# Patient Record
Sex: MALE | Race: WHITE | Employment: OTHER | URBAN - METROPOLITAN AREA
[De-identification: names, ages, dates, MRNs, and addresses within clinical notes are randomized per-mention and may not be internally consistent; named-entity substitution may affect disease eponyms.]

---

## 2017-02-17 PROBLEM — I10 ESSENTIAL HYPERTENSION WITH GOAL BLOOD PRESSURE LESS THAN 130/85: Status: ACTIVE | Noted: 2017-02-17

## 2017-02-17 PROBLEM — E78.2 MIXED HYPERLIPIDEMIA: Status: ACTIVE | Noted: 2017-02-17

## 2018-02-06 PROBLEM — I77.9 CAROTID DISEASE, BILATERAL (HCC): Status: ACTIVE | Noted: 2018-02-06

## 2018-08-07 PROBLEM — R00.1 BRADYCARDIA: Status: ACTIVE | Noted: 2018-08-07

## 2018-10-09 PROBLEM — I77.9 BILATERAL CAROTID ARTERY DISEASE (HCC): Status: ACTIVE | Noted: 2018-10-09

## 2018-10-09 PROBLEM — M79.605 LEG PAIN, LEFT: Status: ACTIVE | Noted: 2018-10-09

## 2018-11-15 PROBLEM — I73.9 CLAUDICATION OF GLUTEAL REGION (HCC): Status: ACTIVE | Noted: 2018-11-15

## 2018-11-15 PROBLEM — M25.552 LEFT HIP PAIN: Status: ACTIVE | Noted: 2018-11-15

## 2018-11-19 ENCOUNTER — HOSPITAL ENCOUNTER (OUTPATIENT)
Dept: SURGERY | Age: 83
Discharge: HOME | End: 2018-11-19
Payer: MEDICARE

## 2018-11-19 VITALS
BODY MASS INDEX: 28 KG/M2 | RESPIRATION RATE: 16 BRPM | HEART RATE: 60 BPM | SYSTOLIC BLOOD PRESSURE: 157 MMHG | DIASTOLIC BLOOD PRESSURE: 82 MMHG | TEMPERATURE: 97.9 F | OXYGEN SATURATION: 98 % | WEIGHT: 200 LBS | HEIGHT: 71 IN

## 2018-11-19 LAB
CREAT SERPL-MCNC: 1.1 MG/DL (ref 0.8–1.5)
HGB BLD-MCNC: 14.5 G/DL (ref 13.6–17.2)
POTASSIUM SERPL-SCNC: 4.8 MMOL/L (ref 3.5–5.1)

## 2018-11-19 PROCEDURE — 84132 ASSAY OF SERUM POTASSIUM: CPT

## 2018-11-19 PROCEDURE — 82565 ASSAY OF CREATININE: CPT

## 2018-11-19 PROCEDURE — 85018 HEMOGLOBIN: CPT

## 2018-11-19 NOTE — PERIOP NOTES
Patient verified name and . Patient provided medical/health information and PTA medications to the best of their ability. TYPE  CASE:1b  Order for consent yes found in EHR and matches case posting. Labs per surgeon:greatinine/gfr. Results: -  Labs per anesthesia protocol: hgb,potassium. Results -  EKG  :  yes    Patient provided with and instructed on education handouts including Guide to Surgery, blood transfusions, pain management, and hand hygiene for the family and community, and [facility] brochure. Munira mist and instructions given per hospital policy. Instructed patient to continue previous medications as prescribed prior to surgery unless otherwise directed and to take the following medications the day of surgery according to anesthesia guidelines : amlodipine,aspirin,plavix . Instructed patient to hold  the following medications: meloxicam.    Original medication prescription bottles none visualized during patient appointment. Patient teach back successful and patient demonstrates knowledge of instruction.

## 2018-11-20 ENCOUNTER — ANESTHESIA EVENT (OUTPATIENT)
Dept: SURGERY | Age: 83
End: 2018-11-20
Payer: MEDICARE

## 2018-11-21 ENCOUNTER — HOSPITAL ENCOUNTER (OUTPATIENT)
Age: 83
Discharge: HOME | End: 2018-11-21
Attending: SURGERY | Admitting: SURGERY
Payer: MEDICARE

## 2018-11-21 ENCOUNTER — ANESTHESIA (OUTPATIENT)
Dept: SURGERY | Age: 83
End: 2018-11-21
Payer: MEDICARE

## 2018-11-21 ENCOUNTER — APPOINTMENT (OUTPATIENT)
Dept: INTERVENTIONAL RADIOLOGY/VASCULAR | Age: 83
End: 2018-11-21
Attending: SURGERY
Payer: MEDICARE

## 2018-11-21 VITALS
SYSTOLIC BLOOD PRESSURE: 141 MMHG | DIASTOLIC BLOOD PRESSURE: 90 MMHG | BODY MASS INDEX: 27.48 KG/M2 | TEMPERATURE: 97.8 F | HEART RATE: 64 BPM | HEIGHT: 71 IN | RESPIRATION RATE: 16 BRPM | OXYGEN SATURATION: 96 % | WEIGHT: 196.25 LBS

## 2018-11-21 PROBLEM — I77.1 ILIAC ARTERY STENOSIS, LEFT (HCC): Status: ACTIVE | Noted: 2018-11-21

## 2018-11-21 PROBLEM — I73.9 CLAUDICATION OF LEFT LOWER EXTREMITY (HCC): Status: ACTIVE | Noted: 2018-11-21

## 2018-11-21 PROBLEM — M79.605 LEFT LEG PAIN: Status: ACTIVE | Noted: 2018-11-21

## 2018-11-21 PROCEDURE — C1887 CATHETER, GUIDING: HCPCS

## 2018-11-21 PROCEDURE — 76060000034 HC ANESTHESIA 1.5 TO 2 HR: Performed by: SURGERY

## 2018-11-21 PROCEDURE — 74011250636 HC RX REV CODE- 250/636: Performed by: SURGERY

## 2018-11-21 PROCEDURE — 74011250636 HC RX REV CODE- 250/636

## 2018-11-21 PROCEDURE — C1769 GUIDE WIRE: HCPCS

## 2018-11-21 PROCEDURE — 74011000250 HC RX REV CODE- 250: Performed by: NURSE PRACTITIONER

## 2018-11-21 PROCEDURE — 77030020782 HC GWN BAIR PAWS FLX 3M -B: Performed by: ANESTHESIOLOGY

## 2018-11-21 PROCEDURE — C1874 STENT, COATED/COV W/DEL SYS: HCPCS | Performed by: SURGERY

## 2018-11-21 PROCEDURE — 74011250636 HC RX REV CODE- 250/636: Performed by: ANESTHESIOLOGY

## 2018-11-21 PROCEDURE — 74011250637 HC RX REV CODE- 250/637: Performed by: ANESTHESIOLOGY

## 2018-11-21 PROCEDURE — 37221 IR AORTOGRAPHY ABDOMINAL SERIAL: CPT

## 2018-11-21 PROCEDURE — 74011000258 HC RX REV CODE- 258: Performed by: SURGERY

## 2018-11-21 PROCEDURE — 76210000030 HC REC RM PH II 5.5 TO 6 HR: Performed by: SURGERY

## 2018-11-21 PROCEDURE — C1894 INTRO/SHEATH, NON-LASER: HCPCS

## 2018-11-21 PROCEDURE — 77030021532 HC CATH ANGI DX IMPRS MRTM -B

## 2018-11-21 PROCEDURE — 74011000258 HC RX REV CODE- 258: Performed by: NURSE PRACTITIONER

## 2018-11-21 PROCEDURE — C1725 CATH, TRANSLUMIN NON-LASER: HCPCS

## 2018-11-21 PROCEDURE — 74011636320 HC RX REV CODE- 636/320: Performed by: SURGERY

## 2018-11-21 PROCEDURE — 77030013519 HC DEV INFL BASIX MRTM -B

## 2018-11-21 PROCEDURE — C1760 CLOSURE DEV, VASC: HCPCS

## 2018-11-21 PROCEDURE — 76010000153 HC OR TIME 1.5 TO 2 HR: Performed by: SURGERY

## 2018-11-21 PROCEDURE — 76210000006 HC OR PH I REC 0.5 TO 1 HR: Performed by: SURGERY

## 2018-11-21 DEVICE — VIABAHN BX BALLOON EXP ENDO 9MMX39MM 8FR 135CMCATH HEPARIN
Type: IMPLANTABLE DEVICE | Site: GROIN | Status: FUNCTIONAL
Brand: GORE VIABAHN VBX BALLOON EXPANDABLE ENDO

## 2018-11-21 RX ORDER — SODIUM CHLORIDE 0.9 % (FLUSH) 0.9 %
5-10 SYRINGE (ML) INJECTION AS NEEDED
Status: DISCONTINUED | OUTPATIENT
Start: 2018-11-21 | End: 2018-11-21 | Stop reason: HOSPADM

## 2018-11-21 RX ORDER — ONDANSETRON 2 MG/ML
4 INJECTION INTRAMUSCULAR; INTRAVENOUS
Status: DISCONTINUED | OUTPATIENT
Start: 2018-11-21 | End: 2018-11-21 | Stop reason: HOSPADM

## 2018-11-21 RX ORDER — FENTANYL CITRATE 50 UG/ML
100 INJECTION, SOLUTION INTRAMUSCULAR; INTRAVENOUS ONCE
Status: DISCONTINUED | OUTPATIENT
Start: 2018-11-21 | End: 2018-11-21 | Stop reason: HOSPADM

## 2018-11-21 RX ORDER — LIDOCAINE HYDROCHLORIDE 20 MG/ML
INJECTION, SOLUTION EPIDURAL; INFILTRATION; INTRACAUDAL; PERINEURAL AS NEEDED
Status: DISCONTINUED | OUTPATIENT
Start: 2018-11-21 | End: 2018-11-21 | Stop reason: HOSPADM

## 2018-11-21 RX ORDER — DIPHENHYDRAMINE HYDROCHLORIDE 50 MG/ML
12.5 INJECTION, SOLUTION INTRAMUSCULAR; INTRAVENOUS
Status: DISCONTINUED | OUTPATIENT
Start: 2018-11-21 | End: 2018-11-21 | Stop reason: HOSPADM

## 2018-11-21 RX ORDER — FAMOTIDINE 20 MG/1
20 TABLET, FILM COATED ORAL ONCE
Status: COMPLETED | OUTPATIENT
Start: 2018-11-21 | End: 2018-11-21

## 2018-11-21 RX ORDER — IODIXANOL 320 MG/ML
INJECTION, SOLUTION INTRAVASCULAR AS NEEDED
Status: DISCONTINUED | OUTPATIENT
Start: 2018-11-21 | End: 2018-11-21 | Stop reason: HOSPADM

## 2018-11-21 RX ORDER — LIDOCAINE HYDROCHLORIDE 10 MG/ML
0.1 INJECTION INFILTRATION; PERINEURAL AS NEEDED
Status: DISCONTINUED | OUTPATIENT
Start: 2018-11-21 | End: 2018-11-21 | Stop reason: HOSPADM

## 2018-11-21 RX ORDER — PROPOFOL 10 MG/ML
INJECTION, EMULSION INTRAVENOUS
Status: DISCONTINUED | OUTPATIENT
Start: 2018-11-21 | End: 2018-11-21 | Stop reason: HOSPADM

## 2018-11-21 RX ORDER — MIDAZOLAM HYDROCHLORIDE 1 MG/ML
2 INJECTION, SOLUTION INTRAMUSCULAR; INTRAVENOUS
Status: DISCONTINUED | OUTPATIENT
Start: 2018-11-21 | End: 2018-11-21 | Stop reason: HOSPADM

## 2018-11-21 RX ORDER — NALOXONE HYDROCHLORIDE 0.4 MG/ML
0.2 INJECTION, SOLUTION INTRAMUSCULAR; INTRAVENOUS; SUBCUTANEOUS AS NEEDED
Status: DISCONTINUED | OUTPATIENT
Start: 2018-11-21 | End: 2018-11-21 | Stop reason: HOSPADM

## 2018-11-21 RX ORDER — SODIUM CHLORIDE 9 MG/ML
75 INJECTION, SOLUTION INTRAVENOUS CONTINUOUS
Status: ACTIVE | OUTPATIENT
Start: 2018-11-21 | End: 2018-11-21

## 2018-11-21 RX ORDER — OXYCODONE HYDROCHLORIDE 5 MG/1
5 TABLET ORAL
Status: DISCONTINUED | OUTPATIENT
Start: 2018-11-21 | End: 2018-11-21 | Stop reason: HOSPADM

## 2018-11-21 RX ORDER — SODIUM CHLORIDE, SODIUM LACTATE, POTASSIUM CHLORIDE, CALCIUM CHLORIDE 600; 310; 30; 20 MG/100ML; MG/100ML; MG/100ML; MG/100ML
25 INJECTION, SOLUTION INTRAVENOUS CONTINUOUS
Status: DISCONTINUED | OUTPATIENT
Start: 2018-11-21 | End: 2018-11-21 | Stop reason: HOSPADM

## 2018-11-21 RX ORDER — PROPOFOL 10 MG/ML
INJECTION, EMULSION INTRAVENOUS AS NEEDED
Status: DISCONTINUED | OUTPATIENT
Start: 2018-11-21 | End: 2018-11-21 | Stop reason: HOSPADM

## 2018-11-21 RX ORDER — NALOXONE HYDROCHLORIDE 0.4 MG/ML
0.2 INJECTION, SOLUTION INTRAMUSCULAR; INTRAVENOUS; SUBCUTANEOUS
Status: DISCONTINUED | OUTPATIENT
Start: 2018-11-21 | End: 2018-11-21 | Stop reason: HOSPADM

## 2018-11-21 RX ORDER — SODIUM CHLORIDE 0.9 % (FLUSH) 0.9 %
5-10 SYRINGE (ML) INJECTION EVERY 8 HOURS
Status: DISCONTINUED | OUTPATIENT
Start: 2018-11-21 | End: 2018-11-21 | Stop reason: HOSPADM

## 2018-11-21 RX ORDER — PROTAMINE SULFATE 10 MG/ML
INJECTION, SOLUTION INTRAVENOUS AS NEEDED
Status: DISCONTINUED | OUTPATIENT
Start: 2018-11-21 | End: 2018-11-21 | Stop reason: HOSPADM

## 2018-11-21 RX ORDER — HEPARIN SODIUM 1000 [USP'U]/ML
INJECTION, SOLUTION INTRAVENOUS; SUBCUTANEOUS AS NEEDED
Status: DISCONTINUED | OUTPATIENT
Start: 2018-11-21 | End: 2018-11-21 | Stop reason: HOSPADM

## 2018-11-21 RX ORDER — MORPHINE SULFATE 10 MG/ML
1 INJECTION, SOLUTION INTRAMUSCULAR; INTRAVENOUS
Status: DISCONTINUED | OUTPATIENT
Start: 2018-11-21 | End: 2018-11-21 | Stop reason: HOSPADM

## 2018-11-21 RX ORDER — MIDAZOLAM HYDROCHLORIDE 1 MG/ML
2 INJECTION, SOLUTION INTRAMUSCULAR; INTRAVENOUS ONCE
Status: DISCONTINUED | OUTPATIENT
Start: 2018-11-21 | End: 2018-11-21 | Stop reason: HOSPADM

## 2018-11-21 RX ORDER — HYDROMORPHONE HYDROCHLORIDE 2 MG/ML
0.5 INJECTION, SOLUTION INTRAMUSCULAR; INTRAVENOUS; SUBCUTANEOUS
Status: DISCONTINUED | OUTPATIENT
Start: 2018-11-21 | End: 2018-11-21 | Stop reason: HOSPADM

## 2018-11-21 RX ORDER — OXYCODONE AND ACETAMINOPHEN 5; 325 MG/1; MG/1
1 TABLET ORAL
Status: DISCONTINUED | OUTPATIENT
Start: 2018-11-21 | End: 2018-11-21 | Stop reason: HOSPADM

## 2018-11-21 RX ORDER — SODIUM CHLORIDE, SODIUM LACTATE, POTASSIUM CHLORIDE, CALCIUM CHLORIDE 600; 310; 30; 20 MG/100ML; MG/100ML; MG/100ML; MG/100ML
75 INJECTION, SOLUTION INTRAVENOUS CONTINUOUS
Status: DISCONTINUED | OUTPATIENT
Start: 2018-11-21 | End: 2018-11-21 | Stop reason: HOSPADM

## 2018-11-21 RX ADMIN — SODIUM CHLORIDE 900 MG: 9 INJECTION, SOLUTION INTRAVENOUS at 08:43

## 2018-11-21 RX ADMIN — SODIUM CHLORIDE, SODIUM LACTATE, POTASSIUM CHLORIDE, AND CALCIUM CHLORIDE: 600; 310; 30; 20 INJECTION, SOLUTION INTRAVENOUS at 09:42

## 2018-11-21 RX ADMIN — HEPARIN SODIUM 4000 UNITS: 1000 INJECTION, SOLUTION INTRAVENOUS; SUBCUTANEOUS at 08:57

## 2018-11-21 RX ADMIN — FAMOTIDINE 20 MG: 20 TABLET ORAL at 07:41

## 2018-11-21 RX ADMIN — LIDOCAINE HYDROCHLORIDE 100 MG: 20 INJECTION, SOLUTION EPIDURAL; INFILTRATION; INTRACAUDAL; PERINEURAL at 08:42

## 2018-11-21 RX ADMIN — SODIUM CHLORIDE, SODIUM LACTATE, POTASSIUM CHLORIDE, AND CALCIUM CHLORIDE 25 ML/HR: 600; 310; 30; 20 INJECTION, SOLUTION INTRAVENOUS at 07:04

## 2018-11-21 RX ADMIN — PROPOFOL 50 MG: 10 INJECTION, EMULSION INTRAVENOUS at 08:42

## 2018-11-21 RX ADMIN — PROPOFOL 140 MCG/KG/MIN: 10 INJECTION, EMULSION INTRAVENOUS at 08:42

## 2018-11-21 RX ADMIN — GENTAMICIN SULFATE 408 MG: 40 INJECTION, SOLUTION INTRAMUSCULAR; INTRAVENOUS at 07:04

## 2018-11-21 RX ADMIN — PROTAMINE SULFATE 20 MG: 10 INJECTION, SOLUTION INTRAVENOUS at 09:41

## 2018-11-21 NOTE — BRIEF OP NOTE
BRIEF OPERATIVE NOTE Date of Procedure: 11/21/2018 Preoperative Diagnosis: Intermittent claudication (HCC) [I73.9] Postoperative Diagnosis: Intermittent claudication (HCC) [I73.9] Procedure(s): ULTRASOUND GUIDED ACCESS AOPRTAGRAM BILATERAL LOWER LEGS RIGHT  ILIAC PTA LEFT ILIAC STENT AND PTA LEG ARTERIOGRAM WITH POSS INTERVENTION Surgeon(s) and Role: Griselda Bence, Barrett Chalk, MD - Primary Surgical Assistant:  
 
Surgical Staff: 
Circ-1: Helio Moon RN 
Circ-Relief: James Raman RN Radiology Technician: Ran Woods, RT, R, CT; Jeramie Thomson; Colette Mays, RT, R, CT Event Time In Time Out Incision Start 4803 Incision Close Anesthesia: General  
Estimated Blood Loss:  
Specimens: * No specimens in log * Findings:   
Complications: None Implants:  
Implant Name Type Inv. Item Serial No.  Lot No. LRB No. Used Action GRAFT STNT EXP [id number] [place] Stent GRAFT STNT EXP [id number] -- Richard Eaton [de-identified]  [employer]  Left 1 Implanted

## 2018-11-21 NOTE — BRIEF OP NOTE
BRIEF OPERATIVE NOTE Date of Procedure: 11/21/2018 Preoperative Diagnosis: Intermittent claudication (HCC) [I73.9] Postoperative Diagnosis: Intermittent claudication (HCC) [I73.9] Procedure(s): ULTRASOUND GUIDED ACCESS AOPRTAGRAM BILATERAL LOWER LEGS RIGHT  ILIAC PTA LEFT ILIAC STENT AND PTA LEG ARTERIOGRAM WITH POSS INTERVENTION Surgeon(s) and Role: Raquel Bear MD - Primary Surgical Assistant:  
 
Surgical Staff: 
Circ-1: Álvaro Mantilla RN 
Circ-Relief: Sean Qureshi RN Radiology Technician: Joel Huang, RT, R, CT; Lesley Wylie; Abby Shell, RT, R, CT Event Time In Time Out Incision Start 7308 Incision Close Anesthesia: General  
Estimated Blood Loss:  
Specimens: * No specimens in log * Findings:   
Complications: None Implants:  
Implant Name Type Inv. Item Serial No.  Lot No. LRB No. Used Action GRAFT STNT EXP [id number] [place] Goes Stent GRAFT STNT EXP [id number] -- Ariadna Yachats [de-identified]  [employer]  Left 1 Implanted

## 2018-11-21 NOTE — DISCHARGE INSTRUCTIONS
Arteriogram: What to Expect at [address] [place] [address] Drive inserted a thin, flexible tube (catheter) into a blood vessel in your groin. In some cases, the catheter is placed in a blood vessel in the arm. After an arteriogram, your groin or arm may have a bruise and feel sore for a day or two. You can do light activities around the house but nothing strenuous for several days. Your doctor may give you specific instructions on when you can do your normal activities again, such as driving and going back to work. This care sheet gives you a general idea about how long it will take for you to recover. But each person recovers at a different pace. Follow the steps below to feel better as quickly as possible. How can you care for yourself at home? Activity  · Do not do strenuous exercise and do not lift, pull, or push anything heavy until your doctor says it is okay. This may be for a day or two. You can walk around the house and do light activity, such as cooking. · You may shower 24 to 48 hours after the procedure, if your doctor okays it. Pat the incision dry. Do not take a bath for 1 week, or until your doctor tells you it is okay. · If the catheter was placed in your groin, try not to walk up stairs for the first couple of days. · If your doctor recommends it, get more exercise. Walking is a good choice. Bit by bit, increase the amount you walk every day. Try for at least 30 minutes on most days of the week. Diet  · Drink plenty of fluids to help your body flush out the dye. If you have kidney, heart, or liver disease and have to limit fluids, talk with your doctor before you increase the amount of fluids you drink. · You can eat your normal diet. If your stomach is upset, try bland, low-fat foods like plain rice, broiled chicken, toast, and yogurt. Medicines  · Be safe with medicines. Read and follow all instructions on the label.   ¨ If the doctor gave you a prescription medicine for pain, take it as prescribed. ¨ If you are not taking a prescription pain medicine, ask your doctor if you can take an over-the-counter medicine. · If you take blood thinners, such as warfarin (Coumadin), clopidogrel (Plavix), or aspirin, be sure to talk to your doctor. He or she will tell you if and when to start taking those medicines again. Make sure that you understand exactly what your doctor wants you to do. · Your doctor will tell you if and when you can restart your medicines. He or she will also give you instructions about taking any new medicines. Care of the catheter site  · You will have a dressing over the cut (incision). A dressing helps the incision heal and protects it. Your doctor will tell you how to take care of this. · Put ice or a cold pack on the area for 10 to 20 minutes at a time to help with soreness or swelling. Put a thin cloth between the ice and your skin. Follow-up care is a key part of your treatment and safety. Be sure to make and go to all appointments, and call your doctor if you are having problems. It's also a good idea to know your test results and keep a list of the medicines you take. When should you call for help? Call 911 anytime you think you may need emergency care. For example, call if:  · You passed out (lost consciousness). · You have severe trouble breathing. · You have sudden chest pain and shortness of breath, or you cough up blood. Call your doctor now or seek immediate medical care if:  · You are bleeding from the area where the catheter was put in your artery. · You have a fast-growing, painful lump at the catheter site. · You have signs of infection, such as:  ¨ Increased pain, swelling, warmth, or redness. ¨ Red streaks leading from the incision. ¨ Pus draining from the incision. ¨ A fever. Watch closely for any changes in your health, and be sure to contact your doctor if:  · You don't get better as expected.   After general anesthesia or intravenous sedation, for 24 hours or while taking prescription Narcotics:  · Limit your activities  · Do not drive and operate hazardous machinery  · Do not make important personal or business decisions  · Do  not drink alcoholic beverages  · If you have not urinated within 8 hours after discharge, please contact your surgeon on call. *  Please give a list of your current medications to your Primary Care Provider. *  Please update this list whenever your medications are discontinued, doses are      changed, or new medications (including over-the-counter products) are added. *  Please carry medication information at all times in case of emergency situations. These are general instructions for a healthy lifestyle:  No smoking/ No tobacco products/ Avoid exposure to second hand smoke  Surgeon General's Warning:  Quitting smoking now greatly reduces serious risk to your health. Obesity, smoking, and sedentary lifestyle greatly increases your risk for illness  A healthy diet, regular physical exercise & weight monitoring are important for maintaining a healthy lifestyle    You may be retaining fluid if you have a history of heart failure or if you experience any of the following symptoms:  Weight gain of 3 pounds or more overnight or 5 pounds in a week, increased swelling in our hands or feet or shortness of breath while lying flat in bed. Please call your doctor as soon as you notice any of these symptoms; do not wait until your next office visit. Recognize signs and symptoms of STROKE:    F-face looks uneven    A-arms unable to move or move unevenly    S-speech slurred or non-existent    T-time-call 911 as soon as signs and symptoms begin-DO NOT go       Back to bed or wait to see if you get better-TIME IS BRAIN.

## 2018-11-21 NOTE — ANESTHESIA PREPROCEDURE EVALUATION
Anesthetic History PONV Review of Systems / Medical History Patient summary reviewed and pertinent labs reviewed Pulmonary Asthma : well controlled Neuro/Psych Within defined limits Cardiovascular Hypertension Dysrhythmias (Paroxysmal) : atrial fibrillation CAD, PAD and cardiac stents (Last STANISLAW 2014) Exercise tolerance: >4 METS Comments: Denies CP, SOB or changes in functional status GI/Hepatic/Renal 
Within defined limits Endo/Other Within defined limits Other Findings Physical Exam 
 
Airway Mallampati: II 
TM Distance: 4 - 6 cm Neck ROM: normal range of motion Mouth opening: Normal 
 
 Cardiovascular Rhythm: regular Rate: normal 
 
 
 
 Dental 
 
Dentition: Caps/crowns Pulmonary Breath sounds clear to auscultation Abdominal 
GI exam deferred Other Findings Anesthetic Plan ASA: 3 Anesthesia type: total IV anesthesia Induction: Intravenous Anesthetic plan and risks discussed with: Patient and Spouse

## 2018-11-21 NOTE — OP NOTES
[facility] REPORT    Eh Mims  MR#: [id number]  : 1935  ACCOUNT #: [de-identified]   DATE OF SERVICE: 2018    PREOPERATIVE DIAGNOSIS:  Left lower extremity ischemia. POSTOPERATIVE DIAGNOSIS:  Left lower extremity ischemia. PROCEDURES:  1. Abdominal aortogram.  2.  Bilateral lower extremity arterial imaging. 3.  Left common iliac artery PTA and stent placement (9 x 39 [place] VBX). 4.  Ultrasound-guided bilateral common femoral artery access. SURGEON:  Jacquelyn Kitchen MD    ANESTHESIA:  IV sedation with 1% lidocaine local.    TOTAL CONTRAST:  160 mL of Visipaque. TOTAL FLUOROSCOPY TIME:  9 minutes. DESCRIPTION OF PROCEDURE:  The patient was brought to the angio suite, placed on the angio table in supine position. Following adequate IV sedation and a timeout procedure,  the right common femoral artery was then percutaneously punctured under direct vision using ultrasound and local anesthesia. A 5-Burmese sheath was placed over a guidewire. Next, an 0.035 guidewire was advanced in the aorta followed by a 5-Burmese Omniflush catheter. An abdominal aortogram was performed. The catheter was then pulled down just above the aortic bifurcation where bilateral pelvic oblique imaging was performed. Next, bilateral lower extremity bolus bimal imaging was performed  imaging both lower extremities. The patient was systemically heparinized. The left common femoral artery was then percutaneously punctured and an 8 Burmese sheath was placed over a guidewire. An 0.035 guidewire and catheter combination were used to retrograde cannulate the severe proximal left common iliac artery stenosis. At this point, a 9 x 39 VBX stent was advanced over the guidewire and an 8 mm balloon was placed in the common iliac on the right to protect this artery during balloon angioplasty of the balloon expandable stent on the left.   The balloon on the right was expanded followed by deployment of the VBX stent on the left. Completion imaging demonstrated the excellent flow with no residual stenosis. The right iliac system remained widely patent. At this point, protamine was administered and the sheaths were removed. A Mynx closure device was successfully used on the right; however, direct pressure was held on left due to the large caliber sheath that was placed initially. Patient was then awakened from anesthesia and transferred to the recovery room in stable condition. The patient tolerated the procedure well. No complications. ANGIOGRAPHIC FINDINGS:  The abdominal aorta was normal caliber. There is dense calcification noted, but no focal stenosis or aneurysmal change. There are a single right renal artery appeared to be a single left renal artery that branches early. The common iliac arteries on the right is widely patent, on the left there is a very focal eccentric large bulky plaque with high-grade stenosis of the proximal common iliac. Beyond there is poststenotic dilatation. Hypogastric arteries are patent on both sides. The external iliac arteries are patent on both sides. On the right, common femoral and profunda are widely patent. The SFA is patent. There is some diffuse calcification noted at Red's canal, but no focal stenosis. The popliteal artery is widely patent. There is a patent trifurcation and what appears to be 3-vessel runoff to the foot on the right. On the left, common femoral, profunda and SFA are patent. There is some moderate irregularity at Red's canal.  The popliteal artery was patent with no focal stenosis. Trifurcation is patent and there is 3-vessel runoff to the foot with the posterior tibial to be the dominant vessel. IMPRESSION:  Satisfactory PTA and stent placement of high-grade left common iliac artery stenosis.       MD Benson Wilkins / [facility]  D: 11/21/2018 10:36     T: 11/21/2018 12:05  JOB #: [id number]

## 2018-11-21 NOTE — PROGRESS NOTES
's pre-op visit and prayer with patient as requested. Chiqui Cleaning MDiv, BS Board Certified [employer]

## 2018-11-21 NOTE — PERIOP NOTES
Adilson Hawkins called to verify patient's bedrest post operatively, states patient to be 4 hours supine, then 2 hours at 30 degrees head elevation prior to discharge home. Will monitor patient in PACU until then, patient doing well, wife at bedside at this time.

## 2018-11-21 NOTE — ANESTHESIA POSTPROCEDURE EVALUATION
Procedure(s): ULTRASOUND GUIDED ACCESS AOPRTAGRAM BILATERAL LOWER LEGS RIGHT  ILIAC PTA LEFT ILIAC STENT AND PTA LEG ARTERIOGRAM WITH POSS INTERVENTION. Anesthesia Post Evaluation Multimodal analgesia: multimodal analgesia used between 6 hours prior to anesthesia start to PACU discharge Patient location during evaluation: bedside Patient participation: complete - patient participated Level of consciousness: awake and alert Pain score: 1 Pain management: adequate Airway patency: patent Anesthetic complications: no 
Cardiovascular status: acceptable Respiratory status: acceptable Hydration status: acceptable Comments: Pt doing well. Ok to d/c home. Visit Vitals /77 Pulse (!) 52 Temp 36.6 °C (97.8 °F) Resp 16 Ht 5' 11\" (1.803 m) Wt 89 kg (196 lb 4 oz) SpO2 98% BMI 27.37 kg/m²

## 2018-11-24 ENCOUNTER — HOSPITAL ENCOUNTER (OUTPATIENT)
Age: 83
Setting detail: OBSERVATION
Discharge: HOME | End: 2018-11-26
Attending: EMERGENCY MEDICINE | Admitting: INTERNAL MEDICINE
Payer: MEDICARE

## 2018-11-24 ENCOUNTER — APPOINTMENT (OUTPATIENT)
Dept: CT IMAGING | Age: 83
End: 2018-11-24
Attending: EMERGENCY MEDICINE
Payer: MEDICARE

## 2018-11-24 ENCOUNTER — APPOINTMENT (OUTPATIENT)
Dept: GENERAL RADIOLOGY | Age: 83
End: 2018-11-24
Attending: EMERGENCY MEDICINE
Payer: MEDICARE

## 2018-11-24 DIAGNOSIS — R55 NEAR SYNCOPE: Primary | ICD-10-CM

## 2018-11-24 PROBLEM — M19.90 ARTHRITIS: Chronic | Status: ACTIVE | Noted: 2018-11-24

## 2018-11-24 LAB
ALBUMIN SERPL-MCNC: 3.6 G/DL (ref 3.2–4.6)
ALBUMIN/GLOB SERPL: 1 {RATIO} (ref 1.2–3.5)
ALP SERPL-CCNC: 62 U/L (ref 50–136)
ALT SERPL-CCNC: 24 U/L (ref 12–65)
ANION GAP SERPL CALC-SCNC: 10 MMOL/L (ref 7–16)
APTT PPP: 24.5 SEC (ref 23.2–35.3)
AST SERPL-CCNC: 30 U/L (ref 15–37)
ATRIAL RATE: 59 BPM
BASOPHILS # BLD: 0 K/UL (ref 0–0.2)
BASOPHILS NFR BLD: 0 % (ref 0–2)
BILIRUB SERPL-MCNC: 0.7 MG/DL (ref 0.2–1.1)
BUN SERPL-MCNC: 23 MG/DL (ref 8–23)
CALCIUM SERPL-MCNC: 8.8 MG/DL (ref 8.3–10.4)
CALCULATED P AXIS, ECG09: 53 DEGREES
CALCULATED R AXIS, ECG10: 34 DEGREES
CALCULATED T AXIS, ECG11: 6 DEGREES
CHLORIDE SERPL-SCNC: 106 MMOL/L (ref 98–107)
CO2 SERPL-SCNC: 25 MMOL/L (ref 21–32)
CREAT SERPL-MCNC: 1.09 MG/DL (ref 0.8–1.5)
DIAGNOSIS, 93000: NORMAL
DIFFERENTIAL METHOD BLD: ABNORMAL
EOSINOPHIL # BLD: 0.2 K/UL (ref 0–0.8)
EOSINOPHIL NFR BLD: 2 % (ref 0.5–7.8)
ERYTHROCYTE [DISTWIDTH] IN BLOOD BY AUTOMATED COUNT: 13 %
GLOBULIN SER CALC-MCNC: 3.7 G/DL (ref 2.3–3.5)
GLUCOSE SERPL-MCNC: 79 MG/DL (ref 65–100)
HCT VFR BLD AUTO: 43.3 % (ref 41.1–50.3)
HGB BLD-MCNC: 14.1 G/DL (ref 13.6–17.2)
IMM GRANULOCYTES # BLD: 0 K/UL (ref 0–0.5)
IMM GRANULOCYTES NFR BLD AUTO: 0 % (ref 0–5)
INR PPP: 1.3
LYMPHOCYTES # BLD: 1.4 K/UL (ref 0.5–4.6)
LYMPHOCYTES NFR BLD: 14 % (ref 13–44)
MCH RBC QN AUTO: 31.5 PG (ref 26.1–32.9)
MCHC RBC AUTO-ENTMCNC: 32.6 G/DL (ref 31.4–35)
MCV RBC AUTO: 96.9 FL (ref 79.6–97.8)
MONOCYTES # BLD: 0.8 K/UL (ref 0.1–1.3)
MONOCYTES NFR BLD: 8 % (ref 4–12)
NEUTS SEG # BLD: 7.9 K/UL (ref 1.7–8.2)
NEUTS SEG NFR BLD: 76 % (ref 43–78)
NRBC # BLD: 0 K/UL (ref 0–0.2)
P-R INTERVAL, ECG05: 168 MS
PLATELET # BLD AUTO: 138 K/UL (ref 150–450)
PLATELET COMMENTS,PCOM: ADEQUATE
PMV BLD AUTO: 9.9 FL (ref 9.4–12.3)
POTASSIUM SERPL-SCNC: 4.2 MMOL/L (ref 3.5–5.1)
PROT SERPL-MCNC: 7.3 G/DL (ref 6.3–8.2)
PROTHROMBIN TIME: 15.6 SEC (ref 11.5–14.5)
Q-T INTERVAL, ECG07: 452 MS
QRS DURATION, ECG06: 106 MS
QTC CALCULATION (BEZET), ECG08: 447 MS
RBC # BLD AUTO: 4.47 M/UL (ref 4.23–5.6)
RBC MORPH BLD: ABNORMAL
SODIUM SERPL-SCNC: 141 MMOL/L (ref 136–145)
TROPONIN I SERPL-MCNC: 0.03 NG/ML (ref 0.02–0.05)
TROPONIN I SERPL-MCNC: 0.05 NG/ML (ref 0.02–0.05)
TROPONIN I SERPL-MCNC: 0.06 NG/ML (ref 0.02–0.05)
VENTRICULAR RATE, ECG03: 59 BPM
WBC # BLD AUTO: 10.3 K/UL (ref 4.3–11.1)

## 2018-11-24 PROCEDURE — 99285 EMERGENCY DEPT VISIT HI MDM: CPT | Performed by: EMERGENCY MEDICINE

## 2018-11-24 PROCEDURE — 74011636320 HC RX REV CODE- 636/320: Performed by: EMERGENCY MEDICINE

## 2018-11-24 PROCEDURE — 74011250637 HC RX REV CODE- 250/637: Performed by: NURSE PRACTITIONER

## 2018-11-24 PROCEDURE — 71260 CT THORAX DX C+: CPT

## 2018-11-24 PROCEDURE — 81003 URINALYSIS AUTO W/O SCOPE: CPT | Performed by: EMERGENCY MEDICINE

## 2018-11-24 PROCEDURE — 85610 PROTHROMBIN TIME: CPT

## 2018-11-24 PROCEDURE — 99218 HC RM OBSERVATION: CPT

## 2018-11-24 PROCEDURE — 36415 COLL VENOUS BLD VENIPUNCTURE: CPT

## 2018-11-24 PROCEDURE — 80053 COMPREHEN METABOLIC PANEL: CPT

## 2018-11-24 PROCEDURE — 77030020263 HC SOL INJ SOD CL0.9% LFCR 1000ML

## 2018-11-24 PROCEDURE — 84484 ASSAY OF TROPONIN QUANT: CPT

## 2018-11-24 PROCEDURE — 85730 THROMBOPLASTIN TIME PARTIAL: CPT

## 2018-11-24 PROCEDURE — 74011000258 HC RX REV CODE- 258: Performed by: EMERGENCY MEDICINE

## 2018-11-24 PROCEDURE — 74011250636 HC RX REV CODE- 250/636: Performed by: NURSE PRACTITIONER

## 2018-11-24 PROCEDURE — 71046 X-RAY EXAM CHEST 2 VIEWS: CPT

## 2018-11-24 PROCEDURE — 85025 COMPLETE CBC W/AUTO DIFF WBC: CPT

## 2018-11-24 PROCEDURE — 93005 ELECTROCARDIOGRAM TRACING: CPT | Performed by: EMERGENCY MEDICINE

## 2018-11-24 RX ORDER — NITROGLYCERIN 0.4 MG/1
0.4 TABLET SUBLINGUAL
Status: DISCONTINUED | OUTPATIENT
Start: 2018-11-24 | End: 2018-11-26 | Stop reason: HOSPADM

## 2018-11-24 RX ORDER — SODIUM CHLORIDE 0.9 % (FLUSH) 0.9 %
5-10 SYRINGE (ML) INJECTION AS NEEDED
Status: DISCONTINUED | OUTPATIENT
Start: 2018-11-24 | End: 2018-11-26 | Stop reason: HOSPADM

## 2018-11-24 RX ORDER — GUAIFENESIN 100 MG/5ML
81 LIQUID (ML) ORAL DAILY
Status: DISCONTINUED | OUTPATIENT
Start: 2018-11-25 | End: 2018-11-26 | Stop reason: HOSPADM

## 2018-11-24 RX ORDER — ACETAMINOPHEN 325 MG/1
650 TABLET ORAL
Status: DISCONTINUED | OUTPATIENT
Start: 2018-11-24 | End: 2018-11-26 | Stop reason: HOSPADM

## 2018-11-24 RX ORDER — CLOPIDOGREL BISULFATE 75 MG/1
75 TABLET ORAL DAILY
Status: DISCONTINUED | OUTPATIENT
Start: 2018-11-25 | End: 2018-11-26 | Stop reason: HOSPADM

## 2018-11-24 RX ORDER — SODIUM CHLORIDE 0.9 % (FLUSH) 0.9 %
10 SYRINGE (ML) INJECTION
Status: COMPLETED | OUTPATIENT
Start: 2018-11-24 | End: 2018-11-24

## 2018-11-24 RX ORDER — ROSUVASTATIN CALCIUM 20 MG/1
40 TABLET, COATED ORAL
Status: DISCONTINUED | OUTPATIENT
Start: 2018-11-24 | End: 2018-11-26 | Stop reason: HOSPADM

## 2018-11-24 RX ORDER — MELOXICAM 7.5 MG/1
15 TABLET ORAL 2 TIMES DAILY
Status: DISCONTINUED | OUTPATIENT
Start: 2018-11-24 | End: 2018-11-26 | Stop reason: HOSPADM

## 2018-11-24 RX ORDER — FAMOTIDINE 20 MG/1
20 TABLET, FILM COATED ORAL EVERY EVENING
Status: DISCONTINUED | OUTPATIENT
Start: 2018-11-24 | End: 2018-11-26 | Stop reason: HOSPADM

## 2018-11-24 RX ORDER — NALOXONE HYDROCHLORIDE 0.4 MG/ML
0.4 INJECTION, SOLUTION INTRAMUSCULAR; INTRAVENOUS; SUBCUTANEOUS AS NEEDED
Status: DISCONTINUED | OUTPATIENT
Start: 2018-11-24 | End: 2018-11-26 | Stop reason: SDUPTHER

## 2018-11-24 RX ORDER — AMLODIPINE BESYLATE 5 MG/1
5 TABLET ORAL DAILY
Status: DISCONTINUED | OUTPATIENT
Start: 2018-11-25 | End: 2018-11-26 | Stop reason: HOSPADM

## 2018-11-24 RX ORDER — METOPROLOL SUCCINATE 25 MG/1
12.5 TABLET, EXTENDED RELEASE ORAL
Status: DISCONTINUED | OUTPATIENT
Start: 2018-11-24 | End: 2018-11-26 | Stop reason: HOSPADM

## 2018-11-24 RX ORDER — SODIUM CHLORIDE 9 MG/ML
50 INJECTION, SOLUTION INTRAVENOUS CONTINUOUS
Status: DISCONTINUED | OUTPATIENT
Start: 2018-11-24 | End: 2018-11-26 | Stop reason: HOSPADM

## 2018-11-24 RX ORDER — ONDANSETRON 2 MG/ML
4 INJECTION INTRAMUSCULAR; INTRAVENOUS
Status: DISCONTINUED | OUTPATIENT
Start: 2018-11-24 | End: 2018-11-26 | Stop reason: HOSPADM

## 2018-11-24 RX ORDER — LOSARTAN POTASSIUM 50 MG/1
50 TABLET ORAL DAILY
Status: DISCONTINUED | OUTPATIENT
Start: 2018-11-25 | End: 2018-11-26 | Stop reason: HOSPADM

## 2018-11-24 RX ORDER — EZETIMIBE 10 MG/1
10 TABLET ORAL DAILY
Status: DISCONTINUED | OUTPATIENT
Start: 2018-11-25 | End: 2018-11-26 | Stop reason: HOSPADM

## 2018-11-24 RX ORDER — SODIUM CHLORIDE 0.9 % (FLUSH) 0.9 %
5-10 SYRINGE (ML) INJECTION EVERY 8 HOURS
Status: DISCONTINUED | OUTPATIENT
Start: 2018-11-24 | End: 2018-11-26 | Stop reason: HOSPADM

## 2018-11-24 RX ADMIN — Medication 10 ML: at 18:54

## 2018-11-24 RX ADMIN — FAMOTIDINE 20 MG: 20 TABLET ORAL at 23:13

## 2018-11-24 RX ADMIN — IOPAMIDOL 100 ML: 755 INJECTION, SOLUTION INTRAVENOUS at 18:54

## 2018-11-24 RX ADMIN — Medication 10 ML: at 23:14

## 2018-11-24 RX ADMIN — SODIUM CHLORIDE 50 ML/HR: 900 INJECTION, SOLUTION INTRAVENOUS at 23:45

## 2018-11-24 RX ADMIN — ROSUVASTATIN CALCIUM 40 MG: 20 TABLET, FILM COATED ORAL at 23:13

## 2018-11-24 RX ADMIN — METOPROLOL SUCCINATE 12.5 MG: 25 TABLET, EXTENDED RELEASE ORAL at 23:13

## 2018-11-24 RX ADMIN — MELOXICAM 15 MG: 7.5 TABLET ORAL at 23:14

## 2018-11-24 RX ADMIN — SODIUM CHLORIDE 100 ML: 900 INJECTION, SOLUTION INTRAVENOUS at 18:54

## 2018-11-24 NOTE — ED TRIAGE NOTES
Pt to triage via w/c with wife at his side. 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 Pt states he got 6 stents at that time. Pt had stent placed in femoral artery (he thinks)  for blockage on Wednesday by Dr. Shantell Valdivia. Today pt felt lightheaded, became sweaty, and pale, but pt never had any pain. Pt states he uses [facility] cardiology. Pt denies nausea/ vomiting, cp, HA, fevers, or SOB. Pt denies any urinary s/sx or issues with surgical site.

## 2018-11-24 NOTE — ED PROVIDER NOTES
60-year-old male presenting for a near-syncopal episode. He was at home having a normal morning and afternoon's reading in his chair. His wife told him it was time for lunch, he stood up and a few seconds later he became lightheaded, pale, sweaty, nauseated and weak. His wife noted the symptoms came to the room and had him lie down. She gave him aspirin and nitroglycerin. While he was lying on the ground he became slightly more short of breath. EMS arrived and placed oxygen and is noted she started to feel better. He now feels back to normal.  He is concerned because these are the symptoms he had during his prior MI. He never had chest pain. The only difference between this and his MIs he completely lost consciousness during his MI. He does report recent vascular procedure where he had a femoral stent placed for claudication. He states that is going very well. The wounds are minimal, minimal bruising and no pain. Syncope This is a new problem. The current episode started 1 to 2 hours ago. The problem has been resolved. There was no loss of consciousness. The problem is associated with standing up. Associated symptoms include light-headedness. Pertinent negatives include no visual change, no chest pain, no palpitations, no clumsiness, no confusion, no fever, no malaise/fatigue, no abdominal pain, no bowel incontinence, no bladder incontinence, no congestion, no headaches, no back pain, no focal weakness, no seizures, no slurred speech, no melena, no anal bleeding and no head injury. Past Medical History:  
Diagnosis Date  Asthma   
 as a child  CAD (coronary artery disease) stent x 11  Dyspnea 6/29/2016  Hypertension  Nausea & vomiting  Paroxysmal atrial fibrillation ([address] 6/29/2016  PVD (peripheral vascular disease) ([address] stent in lt leg  Renal insufficiency 6/29/2016  VF (ventricular fibrillation) ([address] 3/18/2014 Past Surgical History: Procedure Laterality Date  CARDIAC SURG PROCEDURE UNLIST    
 11 stents  HX HEENT Bilateral   
 DE LEFT HEART CATH,PERCUTANEOUS  2012  
 1 stent LAD/ 3 stents RCA  VASCULAR SURGERY PROCEDURE UNLIST    
 lt leg Family History:  
Problem Relation Age of Onset  Heart Disease Father Social History Socioeconomic History  Marital status:  Spouse name: Not on file  Number of children: Not on file  Years of education: Not on file  Highest education level: Not on file Social Needs  Financial resource strain: Not on file  Food insecurity - worry: Not on file  Food insecurity - inability: Not on file  Transportation needs - medical: Not on file  Transportation needs - non-medical: Not on file Occupational History  Not on file Tobacco Use  Smoking status: Former Smoker Last attempt to quit: 1975 Years since quittin.9  Smokeless tobacco: Never Used Substance and Sexual Activity  Alcohol use: Yes Alcohol/week: 3.5 oz Types: 7 Shots of liquor per week  Drug use: Not on file  Sexual activity: Not on file Other Topics Concern  Not on file Social History Narrative  Not on file ALLERGIES: Nuts [tree nut]; Codeine; and Penicillins Review of Systems Constitutional: Negative for fever and malaise/fatigue. HENT: Negative for congestion. Cardiovascular: Positive for syncope. Negative for chest pain and palpitations. Gastrointestinal: Negative for abdominal pain, anal bleeding, bowel incontinence and melena. Genitourinary: Negative for bladder incontinence. Musculoskeletal: Negative for back pain. Neurological: Positive for light-headedness. Negative for focal weakness, seizures and headaches. Psychiatric/Behavioral: Negative for confusion. All other systems reviewed and are negative. Vitals:  
 18 1500 BP: 157/84 Pulse: 72 Resp: 16 Temp: 97.8 °F (36.6 °C) SpO2: 98% Weight: 88.9 kg (196 lb) Height: 5' 11\" (1.803 m) Physical Exam  
Constitutional: He is oriented to person, place, and time. He appears well-developed and well-nourished. HENT:  
Head: Normocephalic and atraumatic. Eyes: Conjunctivae and EOM are normal. Pupils are equal, round, and reactive to light. Neck: Normal range of motion. Neck supple. Cardiovascular: Normal rate, regular rhythm, normal heart sounds and intact distal pulses. Pulmonary/Chest: Effort normal and breath sounds normal.  
Abdominal: Soft. Bowel sounds are normal.  
Musculoskeletal: Normal range of motion. He exhibits no deformity. Neurological: He is alert and oriented to person, place, and time. No cranial nerve deficit. Skin: Skin is warm and dry. Psychiatric: He has a normal mood and affect. His behavior is normal.  
Nursing note and vitals reviewed. MDM Number of Diagnoses or Management Options Near syncope:  
Diagnosis management comments: 12-year-old male presenting for a near-syncopal episode status post recent vascular procedure. Differential includes orthostatic hypotension, vasovagal episode, arrhythmia, pulmonary embolus, renal failure EKG was performed and interpreted by me shows a sinus bradycardia rate 59, normal axis, CT is 168, QRS is 106, QTC is 447 with no acute ischemic changes. Some T-wave inversions in the inferior leadswhich do not appear to be acute Amount and/or Complexity of Data Reviewed Clinical lab tests: ordered and reviewed Tests in the radiology section of CPT®: ordered and reviewed Tests in the medicine section of CPT®: ordered and reviewed Decide to obtain previous medical records or to obtain history from someone other than the patient: yes Discuss the patient with other providers: yes (Dr. Sangeetha Gutiérrez with cardiology) Independent visualization of images, tracings, or specimens: yes (No acute process) Risk of Complications, Morbidity, and/or Mortality Presenting problems: high Diagnostic procedures: high Management options: high Patient Progress Patient progress: stable ED Course as of Nov 24 2100 Sat Nov 24, 2018 1619 This patient's labs are unremarkable. His vital signs are normal.  His EKG is unremarkable. Given his recent vascular procedure we will perform a CT anterior chest to rule out PE. If this is negative I will consult cardiology to discuss given that this is typical of the patient's anginal episodes 4 years ago. [JS] [address] [facility] [place] cardiology and discussed what we found. Recommendation is to get a third troponin. If negative for flat patient can be discharged home to follow up next week. If elevated the patient will be admitted. [JS] 2034 The patient's troponin continues to climb ever so subtly that as a result we will ask the patient be admitted to cardiology. [JS] ED Course User Index [JS] Baltazar Connelly MD  
 
 
Procedures

## 2018-11-24 NOTE — ED TRIAGE NOTES
Per EMS, pt was at home and began having SOB with near syncopal episode. Pt VSS in route and ekg NSR in route. BGL = 91. Pt was here Wednesday and had stent placed near the aortic valve. Pt denies cp or dizziness at this time, but did have dizziness at the time of event. Pt in w/c at triage

## 2018-11-25 LAB
ANION GAP SERPL CALC-SCNC: 9 MMOL/L (ref 7–16)
APTT PPP: 112.1 SEC (ref 23.2–35.3)
APTT PPP: 80.4 SEC (ref 23.2–35.3)
APTT PPP: 94.9 SEC (ref 23.2–35.3)
BUN SERPL-MCNC: 19 MG/DL (ref 8–23)
CALCIUM SERPL-MCNC: 8.3 MG/DL (ref 8.3–10.4)
CHLORIDE SERPL-SCNC: 108 MMOL/L (ref 98–107)
CHOLEST SERPL-MCNC: 109 MG/DL
CO2 SERPL-SCNC: 26 MMOL/L (ref 21–32)
CREAT SERPL-MCNC: 1 MG/DL (ref 0.8–1.5)
ERYTHROCYTE [DISTWIDTH] IN BLOOD BY AUTOMATED COUNT: 13.1 %
GLUCOSE SERPL-MCNC: 131 MG/DL (ref 65–100)
HCT VFR BLD AUTO: 38.6 % (ref 41.1–50.3)
HDLC SERPL-MCNC: 68 MG/DL (ref 40–60)
HDLC SERPL: 1.6 {RATIO}
HGB BLD-MCNC: 12.7 G/DL (ref 13.6–17.2)
LDLC SERPL CALC-MCNC: 30.8 MG/DL
LIPID PROFILE,FLP: ABNORMAL
MAGNESIUM SERPL-MCNC: 2.2 MG/DL (ref 1.8–2.4)
MCH RBC QN AUTO: 31.8 PG (ref 26.1–32.9)
MCHC RBC AUTO-ENTMCNC: 32.9 G/DL (ref 31.4–35)
MCV RBC AUTO: 96.5 FL (ref 79.6–97.8)
NRBC # BLD: 0 K/UL (ref 0–0.2)
PLATELET # BLD AUTO: 166 K/UL (ref 150–450)
PMV BLD AUTO: 9.9 FL (ref 9.4–12.3)
POTASSIUM SERPL-SCNC: 3.8 MMOL/L (ref 3.5–5.1)
RBC # BLD AUTO: 4 M/UL (ref 4.23–5.6)
SODIUM SERPL-SCNC: 143 MMOL/L (ref 136–145)
TRIGL SERPL-MCNC: 51 MG/DL (ref 35–150)
TROPONIN I SERPL-MCNC: 0.03 NG/ML (ref 0.02–0.05)
TROPONIN I SERPL-MCNC: 0.04 NG/ML (ref 0.02–0.05)
TROPONIN I SERPL-MCNC: <0.02 NG/ML (ref 0.02–0.05)
TSH SERPL DL<=0.005 MIU/L-ACNC: 3.55 UIU/ML (ref 0.36–3.74)
VLDLC SERPL CALC-MCNC: 10.2 MG/DL (ref 6–23)
WBC # BLD AUTO: 7.8 K/UL (ref 4.3–11.1)

## 2018-11-25 PROCEDURE — 84443 ASSAY THYROID STIM HORMONE: CPT

## 2018-11-25 PROCEDURE — 85027 COMPLETE CBC AUTOMATED: CPT

## 2018-11-25 PROCEDURE — 85730 THROMBOPLASTIN TIME PARTIAL: CPT

## 2018-11-25 PROCEDURE — 80048 BASIC METABOLIC PNL TOTAL CA: CPT

## 2018-11-25 PROCEDURE — 96375 TX/PRO/DX INJ NEW DRUG ADDON: CPT

## 2018-11-25 PROCEDURE — 77030020263 HC SOL INJ SOD CL0.9% LFCR 1000ML

## 2018-11-25 PROCEDURE — 83735 ASSAY OF MAGNESIUM: CPT

## 2018-11-25 PROCEDURE — 74011000250 HC RX REV CODE- 250: Performed by: INTERNAL MEDICINE

## 2018-11-25 PROCEDURE — 99218 HC RM OBSERVATION: CPT

## 2018-11-25 PROCEDURE — 74011250636 HC RX REV CODE- 250/636

## 2018-11-25 PROCEDURE — 74011250636 HC RX REV CODE- 250/636: Performed by: INTERNAL MEDICINE

## 2018-11-25 PROCEDURE — 84484 ASSAY OF TROPONIN QUANT: CPT

## 2018-11-25 PROCEDURE — 96366 THER/PROPH/DIAG IV INF ADDON: CPT

## 2018-11-25 PROCEDURE — 80061 LIPID PANEL: CPT

## 2018-11-25 PROCEDURE — 74011250636 HC RX REV CODE- 250/636: Performed by: NURSE PRACTITIONER

## 2018-11-25 PROCEDURE — C8929 TTE W OR WO FOL WCON,DOPPLER: HCPCS

## 2018-11-25 PROCEDURE — 74011250637 HC RX REV CODE- 250/637: Performed by: NURSE PRACTITIONER

## 2018-11-25 PROCEDURE — 36415 COLL VENOUS BLD VENIPUNCTURE: CPT

## 2018-11-25 PROCEDURE — 96365 THER/PROPH/DIAG IV INF INIT: CPT

## 2018-11-25 RX ORDER — HEPARIN SODIUM 5000 [USP'U]/100ML
12-25 INJECTION, SOLUTION INTRAVENOUS
Status: DISCONTINUED | OUTPATIENT
Start: 2018-11-25 | End: 2018-11-26 | Stop reason: HOSPADM

## 2018-11-25 RX ORDER — HYDRALAZINE HYDROCHLORIDE 20 MG/ML
10 INJECTION INTRAMUSCULAR; INTRAVENOUS
Status: DISCONTINUED | OUTPATIENT
Start: 2018-11-25 | End: 2018-11-26 | Stop reason: HOSPADM

## 2018-11-25 RX ORDER — HEPARIN SODIUM 5000 [USP'U]/ML
4000 INJECTION, SOLUTION INTRAVENOUS; SUBCUTANEOUS ONCE
Status: COMPLETED | OUTPATIENT
Start: 2018-11-25 | End: 2018-11-25

## 2018-11-25 RX ORDER — HEPARIN SODIUM 5000 [USP'U]/ML
INJECTION, SOLUTION INTRAVENOUS; SUBCUTANEOUS
Status: COMPLETED
Start: 2018-11-25 | End: 2018-11-25

## 2018-11-25 RX ORDER — HEPARIN SODIUM 5000 [USP'U]/100ML
INJECTION, SOLUTION INTRAVENOUS
Status: COMPLETED
Start: 2018-11-25 | End: 2018-11-25

## 2018-11-25 RX ADMIN — ROSUVASTATIN CALCIUM 40 MG: 20 TABLET, FILM COATED ORAL at 21:24

## 2018-11-25 RX ADMIN — EZETIMIBE 10 MG: 10 TABLET ORAL at 21:24

## 2018-11-25 RX ADMIN — ASPIRIN 81 MG 81 MG: 81 TABLET ORAL at 08:47

## 2018-11-25 RX ADMIN — HEPARIN SODIUM AND DEXTROSE 12 UNITS/KG/HR: 5000; 5 INJECTION INTRAVENOUS at 01:38

## 2018-11-25 RX ADMIN — PERFLUTREN 1 ML: 6.52 INJECTION, SUSPENSION INTRAVENOUS at 08:00

## 2018-11-25 RX ADMIN — Medication 10 ML: at 21:26

## 2018-11-25 RX ADMIN — HEPARIN SODIUM AND DEXTROSE 12 UNITS/KG/HR: 5000; 5 INJECTION INTRAVENOUS at 01:37

## 2018-11-25 RX ADMIN — MELOXICAM 15 MG: 7.5 TABLET ORAL at 17:13

## 2018-11-25 RX ADMIN — METOPROLOL SUCCINATE 12.5 MG: 25 TABLET, EXTENDED RELEASE ORAL at 21:24

## 2018-11-25 RX ADMIN — HEPARIN SODIUM 4000 UNITS: 5000 INJECTION, SOLUTION INTRAVENOUS; SUBCUTANEOUS at 01:40

## 2018-11-25 RX ADMIN — CLOPIDOGREL BISULFATE 75 MG: 75 TABLET, FILM COATED ORAL at 08:47

## 2018-11-25 RX ADMIN — HEPARIN SODIUM 4000 UNITS: 5000 INJECTION INTRAVENOUS; SUBCUTANEOUS at 01:40

## 2018-11-25 RX ADMIN — SODIUM CHLORIDE 50 ML/HR: 900 INJECTION, SOLUTION INTRAVENOUS at 17:19

## 2018-11-25 RX ADMIN — FAMOTIDINE 20 MG: 20 TABLET ORAL at 17:13

## 2018-11-25 RX ADMIN — LOSARTAN POTASSIUM 50 MG: 50 TABLET ORAL at 08:47

## 2018-11-25 RX ADMIN — MELOXICAM 15 MG: 7.5 TABLET ORAL at 08:46

## 2018-11-25 RX ADMIN — HEPARIN SODIUM AND DEXTROSE 12 UNITS/KG/HR: 5000; 5 INJECTION INTRAVENOUS at 22:53

## 2018-11-25 RX ADMIN — AMLODIPINE BESYLATE 5 MG: 5 TABLET ORAL at 08:47

## 2018-11-25 NOTE — PROGRESS NOTES
Verbal bedside report received from Geoff Choi, [place]. Assumed care of patient. Heparin IV drip verified at bedside with outgoing RN.

## 2018-11-25 NOTE — PROGRESS NOTES
Bedside and Verbal shift change report given to [facility], [facility], RN (oncoming nurse) by self (offgoing nurse). Report included the following information SBAR, Kardex, MAR and Recent Results. Heparin gtt verified at bedside

## 2018-11-25 NOTE — PROGRESS NOTES
Problem: Falls - Risk of 
Goal: *Absence of Falls Document Georges Ledesma Fall Risk and appropriate interventions in the flowsheet. Outcome: Progressing Towards Goal 
Fall Risk Interventions: 
Mobility Interventions: Bed/chair exit alarm, Patient to call before getting OOB Medication Interventions: Bed/chair exit alarm, Patient to call before getting OOB, Teach patient to arise slowly Elimination Interventions: Call light in reach, Bed/chair exit alarm, Patient to call for help with toileting needs, Urinal in reach

## 2018-11-25 NOTE — PROGRESS NOTES
TRANSFER - IN REPORT: 
 
Verbal report received from JULES whitman(name) on Eveline Cons  being received from ED (unit) for routine progression of care Report consisted of patients Situation, Background, Assessment and  
Recommendations(SBAR). Information from the following report(s) SBAR, Kardex, [facility] and Recent Results was reviewed with the receiving nurse. Opportunity for questions and clarification was provided. Assessment completed upon patients arrival to unit and care assumed. Dual skin assessment with secondary RN - sacrum and heels visualized - intact, no breakdown. Scattered ecchymosis on BUE. S/p PAD L iliac stents (11/21/18): healing bilateral groin sites with ecchymosis noted. Otherwise, skin intact with no abnormalities noted.

## 2018-11-25 NOTE — ED NOTES
TRANSFER - OUT REPORT: 
 
Verbal report given to Malissa(name) on Samson Moscoso  being transferred to [facility](unit) for routine progression of care Report consisted of patients Situation, Background, Assessment and  
Recommendations(SBAR). Information from the following report(s) ED Summary was reviewed with the receiving nurse. Lines:  
Peripheral IV 11/24/18 Left Forearm (Active) Site Assessment Clean, dry, & intact 11/24/2018  3:04 PM  
Phlebitis Assessment 0 11/24/2018  3:04 PM  
Infiltration Assessment 0 11/24/2018  3:04 PM  
Dressing Status Clean, dry, & intact 11/24/2018  3:04 PM  
Dressing Type Transparent 11/24/2018  3:04 PM  
Hub Color/Line Status Green 11/24/2018  3:04 PM  
   
Peripheral IV Right (Active) Opportunity for questions and clarification was provided. Patient transported with: 
 Registered Nurse

## 2018-11-25 NOTE — PROGRESS NOTES
Verbal bedside report given to Elpidio Keenan oncoming RN. Patient's situation, background, assessment and recommendations provided. Opportunity for questions provided. Oncoming RN assumed care of patient. Heparin IV drip verified at bedside with oncoming RN.

## 2018-11-25 NOTE — H&P
[facility] Cardiology History & Physical  
  
Date of  Admission: 11/24/2018  3:07 PM  
 
Primary Care Physician: Dr. Tuan Wilcox Primary Cardiologist: Dr. Eamon Valdivia Admitting Physician: Dr. Alessandro Werner CC: Near syncope HPI:  Dion Hodge is a 80 y.o.  male with PMHx of CAD (IMI 3/2014 w/ VF x3 defib, s/p PCI to pRCA x6 2/2 spiral dissection, had prev PCI x3 2012 -- ostial RCA, dRCA, mLAD), HTN, HLD, GERD, Arthritis, PAD (stent -> distal L SFA 2013 ([facility]), L iliac 11/21/18 - Dr. Suzanne Box) and CKD II who presented to the ED this evening with c/o a near syncopal episode. States that he was sitting in his chair. He went to get up to have lunch and suddenly felt pale, nauseated, diaphoretic and clammy. He felt he might pass out. He was able to walk to the other room where his wife was and told her to call 911. She gave him ASA and a SL NTG. He went to lie on the couch. States after lying down he felt tachy-palpitations -- not irregular just a fast HR. Felt more SOB. Felt this was due to his anxiety. EMS arrived and placed him on O2 which he states helped. He states that his anxiety began to improve and his HR normalized. States with prior MI he had similar s/s but actually \"blacked out\" -- never had any \"chest pain. \" He states that since then he has not had any CP, palpitations or near-syncopal episodes. He reports he had a NST 2016 that was normal. Reports taking meds as directed. In ED: initial trop 0.03 with repeat 0.05 and then 0.06. CT chest (-) for PE. BP is currently elevated at 207/95 Past Medical History:  
Diagnosis Date  Asthma   
 as a child  CAD (coronary artery disease) stent x 11  Dyspnea 6/29/2016  Hypertension  Nausea & vomiting  Paroxysmal atrial fibrillation ([address] 6/29/2016  PVD (peripheral vascular disease) [address] [address] stent in lt leg  Renal insufficiency 6/29/2016  VF (ventricular fibrillation) ([facility] [address] 3/18/2014 Past Surgical History: Procedure Laterality Date  CARDIAC SURG PROCEDURE UNLIST    
 11 stents  HX HEENT Bilateral   
 OK LEFT HEART CATH,PERCUTANEOUS  2012  
 1 stent LAD/ 3 stents RCA  VASCULAR SURGERY PROCEDURE UNLIST    
 lt leg Allergies Allergen Reactions  Nuts [Tree Nut] Anaphylaxis  Codeine Hives  Penicillins Angioedema Social History Socioeconomic History  Marital status:  Spouse name: Not on file  Number of children: Not on file  Years of education: Not on file  Highest education level: Not on file Social Needs  Financial resource strain: Not on file  Food insecurity - worry: Not on file  Food insecurity - inability: Not on file  Transportation needs - medical: Not on file  Transportation needs - non-medical: Not on file Occupational History  Not on file Tobacco Use  Smoking status: Former Smoker Last attempt to quit: 1975 Years since quittin.9  Smokeless tobacco: Never Used Substance and Sexual Activity  Alcohol use: Yes Alcohol/week: 3.5 oz Types: 7 Shots of liquor per week  Drug use: Not on file  Sexual activity: Not on file Other Topics Concern  Not on file Social History Narrative  Not on file Family History Problem Relation Age of Onset  Heart Disease Father No current facility-administered medications for this encounter. Current Outpatient Medications Medication Sig  
 losartan (COZAAR) 50 mg tablet Take  by mouth daily.  raNITIdine (ZANTAC) 150 mg tablet Take 150 mg by mouth nightly.  nitroglycerin (NITROSTAT) 0.4 mg SL tablet 1 Tab by SubLINGual route every five (5) minutes as needed for Chest Pain.  metoprolol succinate (TOPROL-XL) 25 mg XL tablet Take 0.5 Tabs by mouth daily. (Patient taking differently: Take 12.5 mg by mouth nightly.)  OTHER PerserVision 1 po bid for eyes  amLODIPine (NORVASC) 5 mg tablet Take 1 Tab by mouth daily.  clopidogrel (PLAVIX) 75 mg tablet Take  by mouth.  meloxicam (MOBIC) 15 mg tablet Take 15 mg by mouth two (2) times a day.  ZETIA 10 mg tablet Take 10 mg by mouth daily.  aspirin 81 mg chewable tablet Take 1 Tab by mouth daily.  rosuvastatin (CRESTOR) 40 mg tablet Take 40 mg by mouth nightly. Review of Systems Review of Systems Constitution: Positive for diaphoresis. HENT: Negative. Eyes: Negative. Cardiovascular: Positive for near-syncope and palpitations. Negative for chest pain and irregular heartbeat. Respiratory: Positive for shortness of breath. Endocrine: Negative. Hematologic/Lymphatic: Negative. Skin: Negative. Sites from recent vascular procedure healing Musculoskeletal: Positive for arthritis. Gastrointestinal: Positive for nausea. Negative for change in bowel habit and vomiting. Genitourinary: Negative. Neurological: Positive for light-headedness. Psychiatric/Behavioral: Negative. Subjective:  
 
Visit Vitals /81 Pulse 65 Temp 97.8 °F (36.6 °C) Resp 27 Ht 5' 11\" (1.803 m) Wt 88.9 kg (196 lb) SpO2 93% BMI 27.34 kg/m² Physical Exam  
Constitutional: He is oriented to person, place, and time and well-developed, well-nourished, and in no distress. HENT:  
Head: Normocephalic and atraumatic. Nose: Nose normal.  
Mouth/Throat: Oropharynx is clear and moist.  
Eyes: Conjunctivae and EOM are normal. Pupils are equal, round, and reactive to light. No scleral icterus. Neck: Normal range of motion. No JVD present. No tracheal deviation present. Cardiovascular: Normal rate, regular rhythm, normal heart sounds and intact distal pulses. Exam reveals no friction rub. No murmur heard. Pulmonary/Chest: Effort normal and breath sounds normal. No stridor. No respiratory distress. He has no wheezes. He has no rales. Abdominal: Soft. Bowel sounds are normal. He exhibits no distension. There is no tenderness. Musculoskeletal: Normal range of motion. He exhibits no edema. Neurological: He is alert and oriented to person, place, and time. No cranial nerve deficit. Skin: Skin is warm and dry. Psychiatric: Mood, memory, affect and judgment normal.  
 
 
Cardiographics Telemetry: SB/SR on monitor ECG: SB 59 NSST changes Echocardiogram: ordered and pending Labs:  
Recent Results (from the past 24 hour(s)) EKG, 12 LEAD, INITIAL Collection Time: 11/24/18  2:57 PM  
Result Value Ref Range Ventricular Rate 59 BPM  
 Atrial Rate 59 BPM  
 P-R Interval 168 ms QRS Duration 106 ms  
 Q-T Interval 452 ms QTC Calculation (Bezet) 447 ms Calculated P Axis 53 degrees Calculated R Axis 34 degrees Calculated T Axis 6 degrees Diagnosis Sinus bradycardia Nonspecific ST abnormality Abnormal ECG When compared with ECG of 18-MAR-2014 20:04, Sinus rhythm has replaced Atrial fibrillation Vent. rate has decreased BY  86 BPM 
ST no longer elevated in Inferior leads ST no longer depressed in Anterolateral leads Confirmed by Veda Fountain ([id number]) on 11/24/2018 4:17:08 PM 
  
TROPONIN I Collection Time: 11/24/18  3:04 PM  
Result Value Ref Range Troponin-I, Qt. 0.03 0.02 - 0.05 NG/ML  
CBC WITH AUTOMATED DIFF Collection Time: 11/24/18  3:04 PM  
Result Value Ref Range WBC 10.3 4.3 - 11.1 K/uL  
 RBC 4.47 4.23 - 5.6 M/uL  
 HGB 14.1 13.6 - 17.2 g/dL HCT 43.3 41.1 - 50.3 % MCV 96.9 79.6 - 97.8 FL  
 MCH 31.5 26.1 - 32.9 PG  
 MCHC 32.6 31.4 - 35.0 g/dL  
 RDW 13.0 % PLATELET 652 (L) 446 - 450 K/uL MPV 9.9 9.4 - 12.3 FL ABSOLUTE NRBC 0.00 0.0 - 0.2 K/uL NEUTROPHILS 76 43 - 78 % LYMPHOCYTES 14 13 - 44 % MONOCYTES 8 4.0 - 12.0 % EOSINOPHILS 2 0.5 - 7.8 % BASOPHILS 0 0.0 - 2.0 % IMMATURE GRANULOCYTES 0 0.0 - 5.0 %  
 ABS. NEUTROPHILS 7.9 1.7 - 8.2 K/UL  
 ABS. LYMPHOCYTES 1.4 0.5 - 4.6 K/UL  
 ABS. MONOCYTES 0.8 0.1 - 1.3 K/UL ABS. EOSINOPHILS 0.2 0.0 - 0.8 K/UL  
 ABS. BASOPHILS 0.0 0.0 - 0.2 K/UL  
 ABS. IMM. GRANS. 0.0 0.0 - 0.5 K/UL  
 RBC COMMENTS NORMOCYTIC/NORMOCHROMIC PLATELET COMMENTS ADEQUATE    
 DF AUTOMATED METABOLIC PANEL, COMPREHENSIVE Collection Time: 11/24/18  3:04 PM  
Result Value Ref Range Sodium 141 136 - 145 mmol/L Potassium 4.2 3.5 - 5.1 mmol/L Chloride 106 98 - 107 mmol/L  
 CO2 25 21 - 32 mmol/L Anion gap 10 7 - 16 mmol/L Glucose 79 65 - 100 mg/dL BUN 23 8 - 23 MG/DL Creatinine 1.09 0.8 - 1.5 MG/DL  
 GFR est AA >60 >60 ml/min/1.73m2 GFR est non-AA >60 >60 ml/min/1.73m2 Calcium 8.8 8.3 - 10.4 MG/DL Bilirubin, total 0.7 0.2 - 1.1 MG/DL  
 ALT (SGPT) 24 12 - 65 U/L  
 AST (SGOT) 30 15 - 37 U/L Alk. phosphatase 62 50 - 136 U/L Protein, total 7.3 6.3 - 8.2 g/dL Albumin 3.6 3.2 - 4.6 g/dL Globulin 3.7 (H) 2.3 - 3.5 g/dL A-G Ratio 1.0 (L) 1.2 - 3.5 PROTHROMBIN TIME + INR Collection Time: 11/24/18  3:30 PM  
Result Value Ref Range Prothrombin time 15.6 (H) 11.5 - 14.5 sec INR 1.3 PTT Collection Time: 11/24/18  3:30 PM  
Result Value Ref Range aPTT 24.5 23.2 - 35.3 SEC  
TROPONIN I Collection Time: 11/24/18  4:46 PM  
Result Value Ref Range Troponin-I, Qt. 0.05 0.02 - 0.05 NG/ML  
TROPONIN I Collection Time: 11/24/18  7:52 PM  
Result Value Ref Range Troponin-I, Qt. 0.06 (H) 0.02 - 0.05 NG/ML Patient has been seen and examined by Dr. Saroj Jimenez and he agrees with the following assessment and plan: 
 
 Assessment/Plan:  
  
 Principal Problem: 
  Near syncope -- currently feels well and SR on monitor, however due to similarity with prior MI/PCI will admit for observation on tele, check orthostatic BPs, serial Janelle, check ECHO (last was 2014), will give gentle hydration, NPO at MN for poss further intervention/workup pending clinical course.  Noted listed PMHx of carotid disease, unsure if has had carotid US -- consider with near syncope and other noted vascular disease Active Problems: 
  HTN (hypertension), benign -- cont home meds, will add PRN hydralazine CAD (coronary artery disease) -- cont home ASA, Plavix, BB, ARB and statin, will add heparin for poss ACS as presentation similar to prior MI Dyslipidemia -- cont statin, check lipids Kidney disease, chronic, stage II (GFR 60-89 ml/min) -- daily labs, watch I&O 
 
  PAD (peripheral artery disease) -- s/p recent stent to L iliac by Dr. Luis Alfredo Veronica -- appears to be progressing well Arthritis -- cont home meds Mickey Ricci NP 
11/24/2018 9:37 PM

## 2018-11-25 NOTE — PROGRESS NOTES
Problem: Falls - Risk of 
Goal: *Absence of Falls Document Deo Levels Fall Risk and appropriate interventions in the flowsheet. Outcome: Progressing Towards Goal 
Fall Risk Interventions: 
Mobility Interventions: Communicate number of staff needed for ambulation/transfer, Patient to call before getting OOB Medication Interventions: Patient to call before getting OOB, Teach patient to arise slowly Elimination Interventions: Call light in reach, Patient to call for help with toileting needs, Toileting schedule/hourly rounds

## 2018-11-26 VITALS
HEIGHT: 71 IN | WEIGHT: 195.8 LBS | BODY MASS INDEX: 27.41 KG/M2 | HEART RATE: 58 BPM | DIASTOLIC BLOOD PRESSURE: 61 MMHG | OXYGEN SATURATION: 95 % | RESPIRATION RATE: 15 BRPM | SYSTOLIC BLOOD PRESSURE: 128 MMHG | TEMPERATURE: 98 F

## 2018-11-26 LAB
ANION GAP SERPL CALC-SCNC: 8 MMOL/L (ref 7–16)
APTT PPP: 112.7 SEC (ref 23.2–35.3)
BUN SERPL-MCNC: 14 MG/DL (ref 8–23)
CALCIUM SERPL-MCNC: 8.2 MG/DL (ref 8.3–10.4)
CHLORIDE SERPL-SCNC: 113 MMOL/L (ref 98–107)
CHOLEST SERPL-MCNC: 112 MG/DL
CO2 SERPL-SCNC: 24 MMOL/L (ref 21–32)
CREAT SERPL-MCNC: 1.02 MG/DL (ref 0.8–1.5)
ERYTHROCYTE [DISTWIDTH] IN BLOOD BY AUTOMATED COUNT: 13.1 %
GLUCOSE SERPL-MCNC: 99 MG/DL (ref 65–100)
HCT VFR BLD AUTO: 38.8 % (ref 41.1–50.3)
HDLC SERPL-MCNC: 64 MG/DL (ref 40–60)
HDLC SERPL: 1.8 {RATIO}
HGB BLD-MCNC: 12.8 G/DL (ref 13.6–17.2)
LDLC SERPL CALC-MCNC: 34.6 MG/DL
LIPID PROFILE,FLP: ABNORMAL
MAGNESIUM SERPL-MCNC: 2.2 MG/DL (ref 1.8–2.4)
MCH RBC QN AUTO: 32.3 PG (ref 26.1–32.9)
MCHC RBC AUTO-ENTMCNC: 33 G/DL (ref 31.4–35)
MCV RBC AUTO: 98 FL (ref 79.6–97.8)
NRBC # BLD: 0 K/UL (ref 0–0.2)
PLATELET # BLD AUTO: 158 K/UL (ref 150–450)
PMV BLD AUTO: 9.7 FL (ref 9.4–12.3)
POTASSIUM SERPL-SCNC: 3.9 MMOL/L (ref 3.5–5.1)
RBC # BLD AUTO: 3.96 M/UL (ref 4.23–5.6)
SODIUM SERPL-SCNC: 145 MMOL/L (ref 136–145)
TRIGL SERPL-MCNC: 67 MG/DL (ref 35–150)
VLDLC SERPL CALC-MCNC: 13.4 MG/DL (ref 6–23)
WBC # BLD AUTO: 6.9 K/UL (ref 4.3–11.1)

## 2018-11-26 PROCEDURE — 74011000250 HC RX REV CODE- 250: Performed by: INTERNAL MEDICINE

## 2018-11-26 PROCEDURE — 93458 L HRT ARTERY/VENTRICLE ANGIO: CPT

## 2018-11-26 PROCEDURE — 36415 COLL VENOUS BLD VENIPUNCTURE: CPT

## 2018-11-26 PROCEDURE — 74011250636 HC RX REV CODE- 250/636

## 2018-11-26 PROCEDURE — 74011636320 HC RX REV CODE- 636/320: Performed by: INTERNAL MEDICINE

## 2018-11-26 PROCEDURE — 85027 COMPLETE CBC AUTOMATED: CPT

## 2018-11-26 PROCEDURE — 85730 THROMBOPLASTIN TIME PARTIAL: CPT

## 2018-11-26 PROCEDURE — 80048 BASIC METABOLIC PNL TOTAL CA: CPT

## 2018-11-26 PROCEDURE — 74011250637 HC RX REV CODE- 250/637: Performed by: NURSE PRACTITIONER

## 2018-11-26 PROCEDURE — 96366 THER/PROPH/DIAG IV INF ADDON: CPT

## 2018-11-26 PROCEDURE — 83735 ASSAY OF MAGNESIUM: CPT

## 2018-11-26 PROCEDURE — 80061 LIPID PANEL: CPT

## 2018-11-26 PROCEDURE — 99218 HC RM OBSERVATION: CPT

## 2018-11-26 PROCEDURE — 74011250636 HC RX REV CODE- 250/636: Performed by: INTERNAL MEDICINE

## 2018-11-26 PROCEDURE — 99152 MOD SED SAME PHYS/QHP 5/>YRS: CPT

## 2018-11-26 RX ORDER — SODIUM CHLORIDE 9 MG/ML
75 INJECTION, SOLUTION INTRAVENOUS CONTINUOUS
Status: DISCONTINUED | OUTPATIENT
Start: 2018-11-26 | End: 2018-11-26 | Stop reason: HOSPADM

## 2018-11-26 RX ORDER — LIDOCAINE HYDROCHLORIDE 10 MG/ML
3-10 INJECTION INFILTRATION; PERINEURAL
Status: DISCONTINUED | OUTPATIENT
Start: 2018-11-26 | End: 2018-11-26 | Stop reason: HOSPADM

## 2018-11-26 RX ORDER — ACETAMINOPHEN 325 MG/1
650 TABLET ORAL
Status: DISCONTINUED | OUTPATIENT
Start: 2018-11-26 | End: 2018-11-26 | Stop reason: HOSPADM

## 2018-11-26 RX ORDER — SODIUM CHLORIDE 0.9 % (FLUSH) 0.9 %
5-10 SYRINGE (ML) INJECTION EVERY 8 HOURS
Status: DISCONTINUED | OUTPATIENT
Start: 2018-11-26 | End: 2018-11-26 | Stop reason: HOSPADM

## 2018-11-26 RX ORDER — NALOXONE HYDROCHLORIDE 0.4 MG/ML
0.4 INJECTION, SOLUTION INTRAMUSCULAR; INTRAVENOUS; SUBCUTANEOUS AS NEEDED
Status: DISCONTINUED | OUTPATIENT
Start: 2018-11-26 | End: 2018-11-26 | Stop reason: HOSPADM

## 2018-11-26 RX ORDER — MIDAZOLAM HYDROCHLORIDE 1 MG/ML
.5-2 INJECTION, SOLUTION INTRAMUSCULAR; INTRAVENOUS
Status: DISCONTINUED | OUTPATIENT
Start: 2018-11-26 | End: 2018-11-26 | Stop reason: HOSPADM

## 2018-11-26 RX ORDER — ONDANSETRON 2 MG/ML
4 INJECTION INTRAMUSCULAR; INTRAVENOUS
Status: DISCONTINUED | OUTPATIENT
Start: 2018-11-26 | End: 2018-11-26 | Stop reason: HOSPADM

## 2018-11-26 RX ORDER — FENTANYL CITRATE 50 UG/ML
25-100 INJECTION, SOLUTION INTRAMUSCULAR; INTRAVENOUS
Status: DISCONTINUED | OUTPATIENT
Start: 2018-11-26 | End: 2018-11-26 | Stop reason: HOSPADM

## 2018-11-26 RX ORDER — SODIUM CHLORIDE 0.9 % (FLUSH) 0.9 %
5-10 SYRINGE (ML) INJECTION AS NEEDED
Status: DISCONTINUED | OUTPATIENT
Start: 2018-11-26 | End: 2018-11-26 | Stop reason: HOSPADM

## 2018-11-26 RX ORDER — HEPARIN SODIUM 200 [USP'U]/100ML
2 INJECTION, SOLUTION INTRAVENOUS CONTINUOUS
Status: DISCONTINUED | OUTPATIENT
Start: 2018-11-26 | End: 2018-11-26 | Stop reason: HOSPADM

## 2018-11-26 RX ADMIN — LIDOCAINE HYDROCHLORIDE 5 ML: 10 INJECTION, SOLUTION INFILTRATION; PERINEURAL at 08:54

## 2018-11-26 RX ADMIN — LOSARTAN POTASSIUM 50 MG: 50 TABLET ORAL at 08:09

## 2018-11-26 RX ADMIN — IOPAMIDOL 80 ML: 755 INJECTION, SOLUTION INTRAVENOUS at 09:12

## 2018-11-26 RX ADMIN — FENTANYL CITRATE 25 MCG: 50 INJECTION, SOLUTION INTRAMUSCULAR; INTRAVENOUS at 08:47

## 2018-11-26 RX ADMIN — HEPARIN SODIUM 2 UNITS/HR: 5000 INJECTION, SOLUTION INTRAVENOUS; SUBCUTANEOUS at 08:32

## 2018-11-26 RX ADMIN — AMLODIPINE BESYLATE 5 MG: 5 TABLET ORAL at 08:09

## 2018-11-26 RX ADMIN — MELOXICAM 15 MG: 7.5 TABLET ORAL at 08:09

## 2018-11-26 RX ADMIN — ASPIRIN 81 MG 81 MG: 81 TABLET ORAL at 08:09

## 2018-11-26 RX ADMIN — CLOPIDOGREL BISULFATE 75 MG: 75 TABLET, FILM COATED ORAL at 08:09

## 2018-11-26 RX ADMIN — HEPARIN SODIUM 2 ML: 10000 INJECTION INTRAVENOUS; SUBCUTANEOUS at 08:58

## 2018-11-26 RX ADMIN — MIDAZOLAM HYDROCHLORIDE 2 MG: 1 INJECTION, SOLUTION INTRAMUSCULAR; INTRAVENOUS at 08:47

## 2018-11-26 NOTE — PROGRESS NOTES
TRANSFER - IN REPORT: 
 
Verbal report received from Quinten Dodd RN on Yuridia Teresa  being received from [facility] for routine progression of care. Report consisted of patients Situation, Background, Assessment and  
Recommendations(SBAR). Information from the following reports was reviewed: Kardex, Procedure Summary, MAR and Recent Results. Opportunity for questions and clarification was provided. Assessment completed upon patients arrival to unit and care assumed. Patient received to room 327 and assessment completed. Patient connected to telemetry monitor and eagle with BP cycling every 15 minutes. Patient oriented to room and plan of care reviewed. Patient voiced understanding of keeping wrist immobilized. Right radial site benign, dressing dry and intact, no hematoma; R band in place. Patient provided with clear liquids. Patient aware to use call light to communicate needs. Instructed patient to not use arm for any pushing or pulling.

## 2018-11-26 NOTE — PROGRESS NOTES
TRANSFER - OUT REPORT: 
 
Verbal report given to [facility], [facility] RN(name) on Quay Cornea  being returned to 327(unit) for routine progression of care Report consisted of patients Situation, Background, Assessment and  
Recommendations(SBAR). Information from the following report(s) Procedure Summary was reviewed with the receiving nurse. Lines:    
 
Opportunity for questions and clarification was provided. Patient transported with: 
 hospital transport

## 2018-11-26 NOTE — PROGRESS NOTES
Radial compression band removed at 1130 after slowly reducing air from 12 cc to zero as per hospital protocol. No bleeding or hematoma noted. 2 x 2 gauze with tegaderm placed over puncture site. The affected extremity is warm and dry to the touch. Frequent vital signs documented per flowsheet. Patient instructed to call if any bleeding noted on gauze. Patient verbalized understanding the nursing instructions.

## 2018-11-26 NOTE — PROGRESS NOTES
Initial visit was made,prayer,  emotional support and a spiritual presence were provided to the patient and his wife.  card was left with the patient. Alba Dunlap

## 2018-11-26 NOTE — PROGRESS NOTES
Pt admitted to 3rd floor tele for syncope. CM met with pt to discuss CM needs & DCP. Pt is A&Ox4. Pt is indep at home with all ADLS. Pt lives with spouse. Pt has no DME needs. Pt has no difficulty with obtaining medications or transport. DCP home with spouse. No further needs noted. CM to continue to monitor. Care Management Interventions PCP Verified by CM: Yes Last Visit to PCP: 11/15/18 Mode of Transport at Discharge: Other (see comment)(Spouse May Rota [phone]) Transition of Care Consult (CM Consult): Discharge Planning Discharge Durable Medical Equipment: No 
Physical Therapy Consult: No 
Occupational Therapy Consult: No 
Speech Therapy Consult: No 
Current Support Network: Lives with Spouse, Own Home Confirm Follow Up Transport: Family Plan discussed with Pt/Family/Caregiver: Yes Freedom of Choice Offered: Yes Discharge Location Discharge Placement: Home

## 2018-11-26 NOTE — PROGRESS NOTES
Report received from [facility] RN. Procedural findings communicated. Intra procedural  medication administration reviewed. Progression of care discussed. Patient received into [address] post sheath removal.  
 
Right Radial access site without bleeding or swelling TR band dry and intact Patient instructed to limit movement to right upper extremity Routine post procedural vital signs and site assessment initiated

## 2018-11-26 NOTE — PROGRESS NOTES
TRANSFER - OUT REPORT: 
 
[facility] Dr Shahnaz Ware RRA Diagnostic Versed 2 mg Fentanyl 25 mcg 
TR band 12 ml No bleeding/hematoma VSS Pt is a/o no complaints Verbal report given to Krupa(name) on Paty Calles  being transferred to [facility](unit) for routine progression of care Report consisted of patients Situation, Background, Assessment and  
Recommendations(SBAR). Information from the following report(s) SBAR and Procedure Summary was reviewed with the receiving nurse. Lines:  
Peripheral IV Right (Active) Site Assessment Clean, dry, & intact 11/26/2018  7:20 AM  
Phlebitis Assessment 0 11/26/2018  7:20 AM  
Infiltration Assessment 0 11/26/2018  7:20 AM  
Dressing Status Clean, dry, & intact 11/26/2018  7:20 AM  
Dressing Type Transparent;Tape 11/26/2018  7:20 AM  
Hub Color/Line Status Patent; Infusing 11/26/2018  7:20 AM  
Alcohol Cap Used No 11/26/2018  7:20 AM  
  
 
Opportunity for questions and clarification was provided.

## 2018-11-26 NOTE — PROCEDURES
Brief Cardiac Procedure Note    Patient: Matheus Riley MRN: [id number]  SSN: [id number]    YOB: 1935  Age: 80 y.o. Sex: male      Date of Procedure: 11/26/2018     Pre-procedure Diagnosis: Atypical Angina and Unstable Angina    Post-procedure Diagnosis: Coronary Artery Disease    Reason for Procedure: Other: Symptoms concerning unstable angina    Procedure: Left Heart Catheterization    Brief Description of Procedure: LHC via R radial artery    Performed By: Annabella Zavala MD     Assistants: None    Anesthesia: Moderate Sedation    Estimated Blood Loss: Less than 10 mL      Specimens: None    Implants: None    Findings: LM normal, LAD 40% ISR, Circ normal, RCA heavily stented with 30% ostial disease. Appear to have an occluded pLV branch I believe off the RCA that fills via collateral flow from the left but this appears to be a  and is very small sub 2 mm vessel. Complications: None    Recommendations: Continue current medical therapy. Will consider carotid US as outpatient.     Signed By: Annabella Zavala MD     November 26, 2018

## 2018-11-26 NOTE — PROGRESS NOTES
Patient's IVs and telemetry monitor removed from patient. Telemetry monitor returned to monitor room.

## 2018-11-26 NOTE — PROGRESS NOTES
Discharge instructions reviewed with patient. No new prescriptions. Opportunity for questions provided. Patient voiced understanding of all discharge instructions. IV(s) and heart monitor to be removed by primary RN.

## 2018-11-26 NOTE — PROCEDURES
[id number] CATH    Georgette Multani  MR#: [id number]  : 1935  ACCOUNT #: [de-identified]   DATE OF SERVICE: 2018    INDICATIONS:  The patient is an 66-year-old male with a history of coronary artery disease, status post PCI to the LAD and to the right coronary artery in the past, who presented with signs and symptoms that were similar to his previous myocardial infarction, which included a near syncopal episode, diaphoresis and lightheadedness preceded by near syncope. He ruled out for an acute myocardial infarction by lab tests; however, because his symptoms were so similar to a prior heart attack, it was decided to do an angiogram to define his coronary anatomy. ESTIMATED BLOOD LOSS:  Less than 5 mL. SEDATION:  The patient was given 2 mg of Versed and 25 mcg of fentanyl and monitored conscious sedation beginning at 8:54 and ending at 9L14 by nurse Levy Moore. SPECIMENS REMOVED:  None. COMPLICATIONS:  None. ASSISTANT:  None. Preprocedure timeout was completed. Mallampati score of 2, ASA score of 2. DESCRIPTION OF PROCEDURE:  After informed consent, the patient was prepped and draped in the usual sterile fashion. The right wrist was infiltrated with lidocaine. The right radial artery was accessed via the modified Seldinger technique with a 6-Samoan sheath. A total of 80 mL of Isovue contrast were used for the entire procedure. A Terumo band was used for hemostasis. CATHETERS USED:  Included a 6-Samoan JL3.5, 6-Samoan JR5 and a 6-Samoan angled pigtail catheter. FINDINGS:  1. Left main:  Normal.  2.  Left anterior descending artery:  Had an approximately 40% in-stent restenosis. 3.  Circumflex artery:  Was free of evidence of atherosclerotic coronary artery disease. 4.  Right coronary artery:  Was heavily stented and the stents were patent.   There was a 30% proximal stenosis near the ostium and the remainder of the vessel had luminal irregularities. There was late filling vessel, I assume a PLV, it was sub 2 mm in diameter, that came from a left to right collateral flow and likely represents a  of a distal OM or a PLV off of the right coronary artery. Either way, it was a  and the vessel was very small in diameter. 5.  Left ventricle:  Left ventricular ejection fraction is estimated at 55-60%. 6.  LVEDP:  19 mmHg. CONCLUSIONS:  This is an 79-year-old male who likely presented with a vasovagal syncope rather than a new acute coronary artery syndrome. PLAN:  We will have him continue his current medications. We will check a carotid ultrasound as an outpatient and arrange for him to have a 30-day event monitor to rule out any cardiac arrhythmias as an etiology for his near syncope. We will set him up for followup in clinic.       MD KASEY Bain / NOLAN  D: 11/26/2018 09:36     T: 11/26/2018 11:21  JOB #: [id number]

## 2018-11-26 NOTE — DISCHARGE INSTRUCTIONS
Cardiac Catheterization/Angiography Discharge Instructions    *Check the puncture site frequently for swelling or bleeding. If you see any bleeding, lie down and apply pressure over the area with a clean town or washcloth. Notify your doctor for any redness, swelling, drainage or oozing from the puncture site. Notify your doctor for any fever or chills. *If the leg or arm with the puncture becomes cold, numb or painful, call [facility] at [phone]. *Activity should be limited for the next 48 hours. Climb stairs as little as possible and avoid any stooping, bending or strenuous activity for 48 hours. No heavy lifting (anything over 10 pounds) for three days. *Do not drive for 48 hours. *You may resume your usual diet. Drink more fluids than usual.    *Have a responsible person drive you home and stay with you for at least 24 hours after your heart catheterization/angiography. *You may remove the bandage from your Right Arm in 24 hours. You may shower in 24 hours. No tub baths, hot tubs or swimming for one week. Do not place any lotions, creams, powders, ointments over the puncture site for one week. You may place a clean band-aid over the puncture site each day for 5 days. Change this daily.      cardiac monitor today at [facility] at 3 PM    Carotid ultrasound on Dec 20th @ 1:30    Follow up office appt Dr. Shavon Rothman 2 @ 9:30

## 2018-11-26 NOTE — PROGRESS NOTES
Bedside and Verbal shift change report given to [facility], [facility], RN (oncoming nurse) by self (offgoing nurse). Report included the following information SBAR, Kardex, MAR, Accordion and Recent Results. Heparin gtt verified at bedside

## 2018-11-26 NOTE — DISCHARGE SUMMARY
Physician Discharge Summary     Patient ID:  Guerline Strauss  [id number]  47 y.o.  1935    Admit date: 11/24/2018    Discharge date and time: No discharge date for patient encounter.      Admitting Physician: Carlos Ellington DO     Primary Cardiologist:Dr. Chris Mary     Primary Care MD:Christopher Contreras MD    Discharge Physician: Kena Washington NP    Admission Diagnoses: Near syncope    Discharge Diagnoses:   Patient Active Problem List    Diagnosis Date Noted    Near syncope 11/24/2018    Arthritis 11/24/2018    Left leg pain 11/21/2018    Iliac artery stenosis, left ([address] 11/21/2018    Claudication of left lower extremity ([address] 11/21/2018    Left hip pain 11/15/2018    Claudication of gluteal region [facility]) 11/15/2018    Bilateral carotid artery disease ([address] 10/09/2018    Leg pain, left 10/09/2018    Bradycardia 08/07/2018    Carotid disease, bilateral ([address] 02/06/2018    Essential hypertension with goal blood pressure less than 130/85 02/17/2017    Mixed hyperlipidemia 02/17/2017    Coronary artery disease involving native coronary artery of native heart without angina pectoris 08/16/2016    History of PTCA 07/11/2016    Kidney disease, chronic, stage II (GFR 60-89 ml/min) 07/11/2016    Myocardial infarction of inferior wall ([address] 07/11/2016    PAD (peripheral artery disease) ([facility] 07/11/2016    Paroxysmal atrial fibrillation ([facility]) 06/29/2016    Renal insufficiency 06/29/2016    Dyspnea 06/29/2016    Acute myocardial infarction of other inferior wall, initial episode of care 03/18/2014    Dyslipidemia 03/18/2014    VF (ventricular fibrillation) ([address] 03/18/2014    S/P coronary artery stent placement(cutting ballon to mLAD, 3.0x18 Xience & 3.5x18 Xience to dRCA and 4.0x12 Ion to ostial RCA on 1/9/2012) 01/10/2012    CAD (coronary artery disease) 01/10/2012    S/P PTCA/STENT 01/09/2012    HTN (hypertension), benign 01/09/2012           Hospital Course:   Gaurav Durant Leeann Olivier is a 80 y.o.  male with PMHx of CAD (IMI 3/2014 w/ VF x3 defib, s/p PCI to pRCA x6 2/2 spiral dissection, had prev PCI x3 2012 -- ostial RCA, dRCA, mLAD), HTN, HLD, GERD, Arthritis, PAD (stent -> distal L SFA 2013 ([facility]), L iliac 11/21/18 - Dr. Jalen Armstrong) and CKD II who presented to the ED this evening with c/o a near syncopal episode. States that he was sitting in his chair. He went to get up to have lunch and suddenly felt pale, nauseated, diaphoretic and clammy. He felt he might pass out. He was able to walk to the other room where his wife was and told her to call 911. She gave him ASA and a SL NTG. He went to lie on the couch. States after lying down he felt tachy-palpitations -- not irregular just a fast HR. Felt more SOB. Felt this was due to his anxiety. EMS arrived and placed him on O2 which he states helped. He states that his anxiety began to improve and his HR normalized. States with prior MI he had similar s/s but actually \"blacked out\" -- never had any \"chest pain. \" He states that since then he has not had any CP, palpitations or near-syncopal episodes. He reports he had a NST 2016 that was normal. Reports taking meds as directed.      In ED: initial trop 0.03 with repeat 0.05 and then 0.06. CT chest (-) for PE. BP is currently elevated at 207/95       Pt was admitted for further evaluation. Cardiac cath was performed noting stable anatomy. No arrhythmias evident on telemetry. He has resting bradycardia on low dose toprol, which we will continue. Will arrange 30 day event monitor to evaluate for any further arrhythmias and carotid US for near syncope. No change in his Rx regimen. DISPOSITION: The patient is being discharged to home on a low saturated fat, low cholesterol diet. Pt is instructed to abstain from any heavy lifting, straining, stooping or driving for 5 days. Pt is instructed to watch groin site ( if groin access was performed) for bleeding/oozing.  If so,pt is instructed to apply firm pressure with clean cloth and call office at [phone]. Pt is instructed to watch for signs of infection which include increasing area of redness around site, fever/hot to touch or purulent drainage. Pt is instructed not to soak in a tub bath for 1 week, but it is okay to shower. Discharge Exam:     Visit Vitals  /69   Pulse (!) 51   Temp 97.9 °F (36.6 °C)   Resp 16   Ht 5' 11\" (1.803 m)   Wt 88.8 kg (195 lb 12.8 oz)   SpO2 94%   BMI 27.31 kg/m²     General Appearance:  Well developed, well nourished,alert and oriented x 3, and individual in no acute distress. Ears/Nose/Mouth/Throat:   Hearing grossly normal.         Neck: Supple. Chest:   Lungs clear to auscultation bilaterally. Cardiovascular:  Regular rate and rhythm, S1, S2 normal, no murmur. Abdomen:   Soft, non-tender, bowel sounds are active. Extremities: No edema bilaterally. Skin: Warm and dry.                Final Laboratory Data:  Recent Results (from the past 24 hour(s))   PTT    Collection Time: 11/25/18  4:07 PM   Result Value Ref Range    aPTT 94.9 (H) 23.2 - 35.3 SEC   PTT    Collection Time: 11/25/18  9:42 PM   Result Value Ref Range    aPTT 80.4 (H) 23.2 - 52.0 SEC   METABOLIC PANEL, BASIC    Collection Time: 11/26/18  5:33 AM   Result Value Ref Range    Sodium 145 136 - 145 mmol/L    Potassium 3.9 3.5 - 5.1 mmol/L    Chloride 113 (H) 98 - 107 mmol/L    CO2 24 21 - 32 mmol/L    Anion gap 8 7 - 16 mmol/L    Glucose 99 65 - 100 mg/dL    BUN 14 8 - 23 MG/DL    Creatinine 1.02 0.8 - 1.5 MG/DL    GFR est AA >60 >60 ml/min/1.73m2    GFR est non-AA >60 >60 ml/min/1.73m2    Calcium 8.2 (L) 8.3 - 10.4 MG/DL   CBC W/O DIFF    Collection Time: 11/26/18  5:33 AM   Result Value Ref Range    WBC 6.9 4.3 - 11.1 K/uL    RBC 3.96 (L) 4.23 - 5.6 M/uL    HGB 12.8 (L) 13.6 - 17.2 g/dL    HCT 38.8 (L) 41.1 - 50.3 %    MCV 98.0 (H) 79.6 - 97.8 FL    MCH 32.3 26.1 - 32.9 PG    MCHC 33.0 31.4 - 35.0 g/dL    RDW 13.1 % PLATELET 912 462 - 780 K/uL    MPV 9.7 9.4 - 12.3 FL    ABSOLUTE NRBC 0.00 0.0 - 0.2 K/uL   MAGNESIUM    Collection Time: 11/26/18  5:33 AM   Result Value Ref Range    Magnesium 2.2 1.8 - 2.4 mg/dL   LIPID PANEL    Collection Time: 11/26/18  5:33 AM   Result Value Ref Range    LIPID PROFILE          Cholesterol, total 112 <200 MG/DL    Triglyceride 67 35 - 150 MG/DL    HDL Cholesterol 64 (H) 40 - 60 MG/DL    LDL, calculated 34.6 <100 MG/DL    VLDL, calculated 13.4 6.0 - 23.0 MG/DL    CHOL/HDL Ratio 1.8     PTT    Collection Time: 11/26/18  5:33 AM   Result Value Ref Range    aPTT 112.7 (H) 23.2 - 35.3 SEC       Disposition: home    Patient Instructions:   Current Discharge Medication List      CONTINUE these medications which have NOT CHANGED    Details   losartan (COZAAR) 50 mg tablet Take  by mouth daily. raNITIdine (ZANTAC) 150 mg tablet Take 150 mg by mouth nightly. nitroglycerin (NITROSTAT) 0.4 mg SL tablet 1 Tab by SubLINGual route every five (5) minutes as needed for Chest Pain. Qty: 25 Tab, Refills: 11      metoprolol succinate (TOPROL-XL) 25 mg XL tablet Take 0.5 Tabs by mouth daily. Qty: 90 Tab, Refills: 3      OTHER PerserVision 1 po bid for eyes       amLODIPine (NORVASC) 5 mg tablet Take 1 Tab by mouth daily. Qty: 90 Tab, Refills: 3      clopidogrel (PLAVIX) 75 mg tablet Take  by mouth.      meloxicam (MOBIC) 15 mg tablet Take 15 mg by mouth two (2) times a day. Refills: 3      ZETIA 10 mg tablet Take 10 mg by mouth daily. Patient states he takes at bedtime  Refills: 3      aspirin 81 mg chewable tablet Take 1 Tab by mouth daily. rosuvastatin (CRESTOR) 40 mg tablet Take 40 mg by mouth nightly. Referenced discharge instructions provided by nursing for diet and activity. Follow-up:  Primary Cardiologist:Dr. Saurabh Chavez as scheduled with monitor to be placed today, carotid US Dec [email or web address]  PCP: (Liza Molina MD) in about 4 weeks.     Signed:  Malia Carroll NP  11/26/2018  9:42 AM

## 2019-10-28 ENCOUNTER — APPOINTMENT (OUTPATIENT)
Dept: CT IMAGING | Age: 84
End: 2019-10-28
Attending: EMERGENCY MEDICINE
Payer: MEDICARE

## 2019-10-28 ENCOUNTER — HOSPITAL ENCOUNTER (OUTPATIENT)
Age: 84
Setting detail: OBSERVATION
Discharge: HOME | End: 2019-10-30
Attending: EMERGENCY MEDICINE | Admitting: FAMILY MEDICINE
Payer: MEDICARE

## 2019-10-28 DIAGNOSIS — H34.12 CENTRAL RETINAL ARTERY OCCLUSION OF LEFT EYE: Primary | ICD-10-CM

## 2019-10-28 PROBLEM — I77.1 ILIAC ARTERY STENOSIS, LEFT (HCC): Status: RESOLVED | Noted: 2018-11-21 | Resolved: 2019-10-28

## 2019-10-28 PROBLEM — R55 NEAR SYNCOPE: Status: RESOLVED | Noted: 2018-11-24 | Resolved: 2019-10-28

## 2019-10-28 PROBLEM — M19.90 ARTHRITIS: Chronic | Status: RESOLVED | Noted: 2018-11-24 | Resolved: 2019-10-28

## 2019-10-28 PROBLEM — I73.9 CLAUDICATION OF LEFT LOWER EXTREMITY (HCC): Status: RESOLVED | Noted: 2018-11-21 | Resolved: 2019-10-28

## 2019-10-28 PROBLEM — M79.605 LEG PAIN, LEFT: Status: RESOLVED | Noted: 2018-10-09 | Resolved: 2019-10-28

## 2019-10-28 PROBLEM — I73.9 CLAUDICATION OF GLUTEAL REGION (HCC): Status: RESOLVED | Noted: 2018-11-15 | Resolved: 2019-10-28

## 2019-10-28 PROBLEM — E78.2 MIXED HYPERLIPIDEMIA: Status: RESOLVED | Noted: 2017-02-17 | Resolved: 2019-10-28

## 2019-10-28 PROBLEM — M79.605 LEFT LEG PAIN: Status: RESOLVED | Noted: 2018-11-21 | Resolved: 2019-10-28

## 2019-10-28 PROBLEM — R00.1 BRADYCARDIA: Status: RESOLVED | Noted: 2018-08-07 | Resolved: 2019-10-28

## 2019-10-28 PROBLEM — H54.62 VISION LOSS OF LEFT EYE: Status: ACTIVE | Noted: 2019-10-28

## 2019-10-28 PROBLEM — M25.552 LEFT HIP PAIN: Status: RESOLVED | Noted: 2018-11-15 | Resolved: 2019-10-28

## 2019-10-28 LAB
ALBUMIN SERPL-MCNC: 3.8 G/DL (ref 3.2–4.6)
ALBUMIN/GLOB SERPL: 1.1 {RATIO} (ref 1.2–3.5)
ALP SERPL-CCNC: 67 U/L (ref 50–136)
ALT SERPL-CCNC: 27 U/L (ref 12–65)
ANION GAP SERPL CALC-SCNC: 7 MMOL/L (ref 7–16)
AST SERPL-CCNC: 28 U/L (ref 15–37)
ATRIAL RATE: 59 BPM
BASOPHILS # BLD: 0.1 K/UL (ref 0–0.2)
BASOPHILS NFR BLD: 1 % (ref 0–2)
BILIRUB SERPL-MCNC: 0.4 MG/DL (ref 0.2–1.1)
BUN SERPL-MCNC: 23 MG/DL (ref 8–23)
CALCIUM SERPL-MCNC: 8.7 MG/DL (ref 8.3–10.4)
CALCULATED P AXIS, ECG09: 102 DEGREES
CALCULATED R AXIS, ECG10: 7 DEGREES
CALCULATED T AXIS, ECG11: 43 DEGREES
CHLORIDE SERPL-SCNC: 111 MMOL/L (ref 98–107)
CO2 SERPL-SCNC: 24 MMOL/L (ref 21–32)
CREAT SERPL-MCNC: 1.13 MG/DL (ref 0.8–1.5)
CRP SERPL-MCNC: <0.3 MG/DL (ref 0–0.9)
DIAGNOSIS, 93000: NORMAL
DIFFERENTIAL METHOD BLD: ABNORMAL
EOSINOPHIL # BLD: 0.4 K/UL (ref 0–0.8)
EOSINOPHIL NFR BLD: 4 % (ref 0.5–7.8)
ERYTHROCYTE [DISTWIDTH] IN BLOOD BY AUTOMATED COUNT: 13.2 % (ref 11.9–14.6)
ERYTHROCYTE [SEDIMENTATION RATE] IN BLOOD: 14 MM/HR (ref 0–20)
GLOBULIN SER CALC-MCNC: 3.6 G/DL (ref 2.3–3.5)
GLUCOSE BLD STRIP.AUTO-MCNC: 81 MG/DL (ref 65–100)
GLUCOSE SERPL-MCNC: 83 MG/DL (ref 65–100)
HCT VFR BLD AUTO: 41.4 % (ref 41.1–50.3)
HGB BLD-MCNC: 13.9 G/DL (ref 13.6–17.2)
IMM GRANULOCYTES # BLD AUTO: 0 K/UL (ref 0–0.5)
IMM GRANULOCYTES NFR BLD AUTO: 0 % (ref 0–5)
INR BLD: 1 (ref 0.9–1.2)
LYMPHOCYTES # BLD: 2.2 K/UL (ref 0.5–4.6)
LYMPHOCYTES NFR BLD: 25 % (ref 13–44)
MCH RBC QN AUTO: 32.3 PG (ref 26.1–32.9)
MCHC RBC AUTO-ENTMCNC: 33.6 G/DL (ref 31.4–35)
MCV RBC AUTO: 96.3 FL (ref 79.6–97.8)
MONOCYTES # BLD: 1 K/UL (ref 0.1–1.3)
MONOCYTES NFR BLD: 12 % (ref 4–12)
NEUTS SEG # BLD: 5.1 K/UL (ref 1.7–8.2)
NEUTS SEG NFR BLD: 58 % (ref 43–78)
NRBC # BLD: 0 K/UL (ref 0–0.2)
P-R INTERVAL, ECG05: 176 MS
PLATELET # BLD AUTO: 183 K/UL (ref 150–450)
PMV BLD AUTO: 9.1 FL (ref 9.4–12.3)
POTASSIUM SERPL-SCNC: 3.7 MMOL/L (ref 3.5–5.1)
PROT SERPL-MCNC: 7.4 G/DL (ref 6.3–8.2)
PT BLD: 12.4 SECS (ref 9.6–11.6)
Q-T INTERVAL, ECG07: 420 MS
QRS DURATION, ECG06: 108 MS
QTC CALCULATION (BEZET), ECG08: 415 MS
RBC # BLD AUTO: 4.3 M/UL (ref 4.23–5.6)
SODIUM SERPL-SCNC: 142 MMOL/L (ref 136–145)
VENTRICULAR RATE, ECG03: 59 BPM
WBC # BLD AUTO: 8.8 K/UL (ref 4.3–11.1)

## 2019-10-28 PROCEDURE — 99285 EMERGENCY DEPT VISIT HI MDM: CPT | Performed by: EMERGENCY MEDICINE

## 2019-10-28 PROCEDURE — 86140 C-REACTIVE PROTEIN: CPT

## 2019-10-28 PROCEDURE — 85652 RBC SED RATE AUTOMATED: CPT

## 2019-10-28 PROCEDURE — 99218 HC RM OBSERVATION: CPT

## 2019-10-28 PROCEDURE — 70450 CT HEAD/BRAIN W/O DYE: CPT

## 2019-10-28 PROCEDURE — 85610 PROTHROMBIN TIME: CPT

## 2019-10-28 PROCEDURE — 80053 COMPREHEN METABOLIC PANEL: CPT

## 2019-10-28 PROCEDURE — 93005 ELECTROCARDIOGRAM TRACING: CPT | Performed by: EMERGENCY MEDICINE

## 2019-10-28 PROCEDURE — 74011250637 HC RX REV CODE- 250/637: Performed by: FAMILY MEDICINE

## 2019-10-28 PROCEDURE — 82962 GLUCOSE BLOOD TEST: CPT

## 2019-10-28 PROCEDURE — 85025 COMPLETE CBC W/AUTO DIFF WBC: CPT

## 2019-10-28 RX ORDER — AMLODIPINE BESYLATE 5 MG/1
5 TABLET ORAL DAILY
Status: DISCONTINUED | OUTPATIENT
Start: 2019-10-29 | End: 2019-10-30 | Stop reason: HOSPADM

## 2019-10-28 RX ORDER — FAMOTIDINE 20 MG/1
20 TABLET, FILM COATED ORAL 2 TIMES DAILY
Status: DISCONTINUED | OUTPATIENT
Start: 2019-10-29 | End: 2019-10-30 | Stop reason: HOSPADM

## 2019-10-28 RX ORDER — CLOPIDOGREL BISULFATE 75 MG/1
75 TABLET ORAL DAILY
Status: DISCONTINUED | OUTPATIENT
Start: 2019-10-29 | End: 2019-10-30 | Stop reason: HOSPADM

## 2019-10-28 RX ORDER — ASPIRIN 325 MG
325 TABLET ORAL DAILY
Status: DISCONTINUED | OUTPATIENT
Start: 2019-10-29 | End: 2019-10-30 | Stop reason: HOSPADM

## 2019-10-28 RX ORDER — GUAIFENESIN 100 MG/5ML
81 LIQUID (ML) ORAL DAILY
Status: DISCONTINUED | OUTPATIENT
Start: 2019-10-29 | End: 2019-10-29

## 2019-10-28 RX ORDER — ONDANSETRON 2 MG/ML
4 INJECTION INTRAMUSCULAR; INTRAVENOUS
Status: DISCONTINUED | OUTPATIENT
Start: 2019-10-28 | End: 2019-10-30 | Stop reason: HOSPADM

## 2019-10-28 RX ORDER — LOSARTAN POTASSIUM 50 MG/1
50 TABLET ORAL DAILY
Status: DISCONTINUED | OUTPATIENT
Start: 2019-10-29 | End: 2019-10-30 | Stop reason: HOSPADM

## 2019-10-28 RX ORDER — EZETIMIBE 10 MG/1
10 TABLET ORAL DAILY
Status: DISCONTINUED | OUTPATIENT
Start: 2019-10-29 | End: 2019-10-29

## 2019-10-28 RX ORDER — FAMOTIDINE 20 MG/1
20 TABLET, FILM COATED ORAL 2 TIMES DAILY
Status: DISCONTINUED | OUTPATIENT
Start: 2019-10-29 | End: 2019-10-28

## 2019-10-28 RX ORDER — SODIUM CHLORIDE 0.9 % (FLUSH) 0.9 %
5-40 SYRINGE (ML) INJECTION AS NEEDED
Status: DISCONTINUED | OUTPATIENT
Start: 2019-10-28 | End: 2019-10-30 | Stop reason: HOSPADM

## 2019-10-28 RX ORDER — ENOXAPARIN SODIUM 100 MG/ML
40 INJECTION SUBCUTANEOUS EVERY 24 HOURS
Status: DISCONTINUED | OUTPATIENT
Start: 2019-10-29 | End: 2019-10-30 | Stop reason: HOSPADM

## 2019-10-28 RX ORDER — ROSUVASTATIN CALCIUM 20 MG/1
40 TABLET, COATED ORAL
Status: DISCONTINUED | OUTPATIENT
Start: 2019-10-29 | End: 2019-10-30 | Stop reason: HOSPADM

## 2019-10-28 RX ORDER — SODIUM CHLORIDE 0.9 % (FLUSH) 0.9 %
5-40 SYRINGE (ML) INJECTION EVERY 8 HOURS
Status: DISCONTINUED | OUTPATIENT
Start: 2019-10-29 | End: 2019-10-30 | Stop reason: HOSPADM

## 2019-10-28 RX ORDER — METOPROLOL SUCCINATE 25 MG/1
12.5 TABLET, EXTENDED RELEASE ORAL
Status: DISCONTINUED | OUTPATIENT
Start: 2019-10-29 | End: 2019-10-30 | Stop reason: HOSPADM

## 2019-10-28 RX ORDER — ACETAMINOPHEN 325 MG/1
650 TABLET ORAL
Status: DISCONTINUED | OUTPATIENT
Start: 2019-10-28 | End: 2019-10-30 | Stop reason: HOSPADM

## 2019-10-28 RX ORDER — BISACODYL 5 MG
5 TABLET, DELAYED RELEASE (ENTERIC COATED) ORAL DAILY PRN
Status: DISCONTINUED | OUTPATIENT
Start: 2019-10-28 | End: 2019-10-30 | Stop reason: HOSPADM

## 2019-10-28 RX ADMIN — METOPROLOL SUCCINATE 12.5 MG: 25 TABLET, EXTENDED RELEASE ORAL at 23:50

## 2019-10-28 RX ADMIN — ROSUVASTATIN CALCIUM 40 MG: 20 TABLET, COATED ORAL at 23:50

## 2019-10-29 ENCOUNTER — APPOINTMENT (OUTPATIENT)
Dept: MRI IMAGING | Age: 84
End: 2019-10-29
Attending: FAMILY MEDICINE
Payer: MEDICARE

## 2019-10-29 ENCOUNTER — APPOINTMENT (OUTPATIENT)
Dept: ULTRASOUND IMAGING | Age: 84
End: 2019-10-29
Attending: NURSE PRACTITIONER
Payer: MEDICARE

## 2019-10-29 LAB
ANION GAP SERPL CALC-SCNC: 6 MMOL/L (ref 7–16)
BUN SERPL-MCNC: 21 MG/DL (ref 8–23)
CALCIUM SERPL-MCNC: 8.7 MG/DL (ref 8.3–10.4)
CHLORIDE SERPL-SCNC: 113 MMOL/L (ref 98–107)
CHOLEST SERPL-MCNC: 121 MG/DL
CO2 SERPL-SCNC: 25 MMOL/L (ref 21–32)
CREAT SERPL-MCNC: 1.03 MG/DL (ref 0.8–1.5)
ERYTHROCYTE [DISTWIDTH] IN BLOOD BY AUTOMATED COUNT: 13.2 % (ref 11.9–14.6)
EST. AVERAGE GLUCOSE BLD GHB EST-MCNC: 117 MG/DL
GLUCOSE BLD STRIP.AUTO-MCNC: 96 MG/DL (ref 65–100)
GLUCOSE SERPL-MCNC: 77 MG/DL (ref 65–100)
HBA1C MFR BLD: 5.7 % (ref 4.8–6)
HCT VFR BLD AUTO: 39.2 % (ref 41.1–50.3)
HDLC SERPL-MCNC: 60 MG/DL (ref 40–60)
HDLC SERPL: 2 {RATIO}
HGB BLD-MCNC: 13 G/DL (ref 13.6–17.2)
LDLC SERPL CALC-MCNC: 47.2 MG/DL
LIPID PROFILE,FLP: NORMAL
MAGNESIUM SERPL-MCNC: 2.3 MG/DL (ref 1.8–2.4)
MCH RBC QN AUTO: 32.2 PG (ref 26.1–32.9)
MCHC RBC AUTO-ENTMCNC: 33.2 G/DL (ref 31.4–35)
MCV RBC AUTO: 97 FL (ref 79.6–97.8)
NRBC # BLD: 0 K/UL (ref 0–0.2)
PLATELET # BLD AUTO: 155 K/UL (ref 150–450)
PMV BLD AUTO: 9.3 FL (ref 9.4–12.3)
POTASSIUM SERPL-SCNC: 3.9 MMOL/L (ref 3.5–5.1)
RBC # BLD AUTO: 4.04 M/UL (ref 4.23–5.6)
SODIUM SERPL-SCNC: 144 MMOL/L (ref 136–145)
T4 FREE SERPL-MCNC: 0.8 NG/DL (ref 0.78–1.46)
TRIGL SERPL-MCNC: 69 MG/DL (ref 35–150)
TSH SERPL DL<=0.005 MIU/L-ACNC: 5.29 UIU/ML (ref 0.36–3.74)
VLDLC SERPL CALC-MCNC: 13.8 MG/DL (ref 6–23)
WBC # BLD AUTO: 6.5 K/UL (ref 4.3–11.1)

## 2019-10-29 PROCEDURE — 36415 COLL VENOUS BLD VENIPUNCTURE: CPT

## 2019-10-29 PROCEDURE — 83036 HEMOGLOBIN GLYCOSYLATED A1C: CPT

## 2019-10-29 PROCEDURE — 84481 FREE ASSAY (FT-3): CPT

## 2019-10-29 PROCEDURE — 74011250636 HC RX REV CODE- 250/636: Performed by: FAMILY MEDICINE

## 2019-10-29 PROCEDURE — C8929 TTE W OR WO FOL WCON,DOPPLER: HCPCS

## 2019-10-29 PROCEDURE — 82962 GLUCOSE BLOOD TEST: CPT

## 2019-10-29 PROCEDURE — 80048 BASIC METABOLIC PNL TOTAL CA: CPT

## 2019-10-29 PROCEDURE — 74011000250 HC RX REV CODE- 250: Performed by: FAMILY MEDICINE

## 2019-10-29 PROCEDURE — 84439 ASSAY OF FREE THYROXINE: CPT

## 2019-10-29 PROCEDURE — 99218 HC RM OBSERVATION: CPT

## 2019-10-29 PROCEDURE — 93880 EXTRACRANIAL BILAT STUDY: CPT

## 2019-10-29 PROCEDURE — 80061 LIPID PANEL: CPT

## 2019-10-29 PROCEDURE — 84443 ASSAY THYROID STIM HORMONE: CPT

## 2019-10-29 PROCEDURE — 85027 COMPLETE CBC AUTOMATED: CPT

## 2019-10-29 PROCEDURE — 74011250637 HC RX REV CODE- 250/637: Performed by: FAMILY MEDICINE

## 2019-10-29 PROCEDURE — 83735 ASSAY OF MAGNESIUM: CPT

## 2019-10-29 PROCEDURE — 70551 MRI BRAIN STEM W/O DYE: CPT

## 2019-10-29 RX ORDER — EZETIMIBE 10 MG/1
10 TABLET ORAL
Status: DISCONTINUED | OUTPATIENT
Start: 2019-10-29 | End: 2019-10-30 | Stop reason: HOSPADM

## 2019-10-29 RX ADMIN — Medication 10 ML: at 14:32

## 2019-10-29 RX ADMIN — FAMOTIDINE 20 MG: 20 TABLET, FILM COATED ORAL at 17:50

## 2019-10-29 RX ADMIN — METOPROLOL SUCCINATE 12.5 MG: 25 TABLET, EXTENDED RELEASE ORAL at 21:12

## 2019-10-29 RX ADMIN — Medication 10 ML: at 21:14

## 2019-10-29 RX ADMIN — ROSUVASTATIN CALCIUM 40 MG: 20 TABLET, COATED ORAL at 21:12

## 2019-10-29 RX ADMIN — LOSARTAN POTASSIUM 50 MG: 50 TABLET ORAL at 09:52

## 2019-10-29 RX ADMIN — ASPIRIN 325 MG ORAL TABLET 325 MG: 325 PILL ORAL at 09:53

## 2019-10-29 RX ADMIN — CLOPIDOGREL BISULFATE 75 MG: 75 TABLET ORAL at 09:49

## 2019-10-29 RX ADMIN — FAMOTIDINE 20 MG: 20 TABLET, FILM COATED ORAL at 00:00

## 2019-10-29 RX ADMIN — PERFLUTREN 1 ML: 6.52 INJECTION, SUSPENSION INTRAVENOUS at 09:00

## 2019-10-29 RX ADMIN — AMLODIPINE BESYLATE 5 MG: 5 TABLET ORAL at 09:48

## 2019-10-29 RX ADMIN — EZETIMIBE 10 MG: 10 TABLET ORAL at 21:18

## 2019-10-29 NOTE — H&P
HOSPITALIST H&P  NAME:  Edwina Hunt   Age:  80 y.o.  :   1935   MRN:   [id number]  PCP: Antonietta Beltrán MD  Treatment Team: Primary Nurse: Rojean Barthel, RN    Prior     CC: Reason for admission is: CVA workup    HPI:   Patient history was obtained from the ER provider prior to seeing the patient. Patient is a 80 y.o. male who presents to the ER from his ophthalmologist office due to acute vision loss in left eye. He reports that about 6:30pm he was reading and developed blurry vision, followed soon by loss of vision in his left eye (painless). He was able to meet his ophthalmologist at his office to get checked. His ophthalmologist was concerned for a stroke and brought the patient to the ER and asked that he be admitted for a stroke alert. He has had no other neurological symptoms or changes. He denies any recent illness with fever/chills, headaches, cough/congestion. Denies photophobia, eye discharge or irritation, no n/v.  He does have h/o vascular disease. He recently had carotid dopplers, he reports that there was mild blockages. He has wet macular degeneration of right eye. ROS:  All systems have been reviewed and are negative except as stated in HPI or elsewhere.       Past Medical History:   Diagnosis Date    Acute myocardial infarction of other inferior wall, initial episode of care 3/18/2014    Arthritis 2018    Asthma     as a child    Bradycardia 2018    CAD (coronary artery disease)     stent x 11    Claudication of gluteal region [facility]) 11/15/2018    Claudication of left lower extremity ([address] 2018    Coronary artery disease involving native coronary artery of native heart without angina pectoris 2016    Dyslipidemia 3/18/2014    Dyspnea 2016    History of PTCA 2016    Hypertension     Iliac artery stenosis, left ([address] 2018    Myocardial infarction of inferior wall (HCC) 2016    Nausea & vomiting     [facility] 2016    PVD (peripheral vascular disease) ([facility])     stent in lt leg    Renal insufficiency 2016    S/P coronary artery stent placement(cutting ballon to mLAD, 3.0x18 Xience & 3.5x18 Xience to dRCA and 4.0x12 Ion to ostial RCA on 2012) 1/10/2012    S/P PTCA/STENT 2012    OV:10/15/15 6 stents to RCA 3/18/14 for RCA thrombotic occlusion. Prior LAD stent 2004. 12 -95% LAD stent ISR that was treated with cutting balloon atherectomy. Two Xience stents to the distal RCA and 4.0 Ion stent to the proximal RCA.  VF (ventricular fibrillation) ([facility] 3/18/2014      Past Surgical History:   Procedure Laterality Date    CARDIAC SURG PROCEDURE UNLIST      11 stents    HX HEENT Bilateral     IN LEFT HEART CATH,PERCUTANEOUS  2012    1 stent LAD/ 3 stents RCA     VASCULAR SURGERY PROCEDURE UNLIST      lt leg      Social History     Tobacco Use    Smoking status: Former Smoker     Last attempt to quit: 1975     Years since quittin.8    Smokeless tobacco: Never Used   Substance Use Topics    Alcohol use: Yes     Alcohol/week: 5.8 standard drinks     Types: 7 Shots of liquor per week      Family History   Problem Relation Age of Onset    Heart Disease Father        FH Reviewed and non-contributory to admitting diagnosis    Allergies   Allergen Reactions    Nuts [Tree Nut] Anaphylaxis    Codeine Hives    Penicillins Angioedema      Prior to Admission Medications   Prescriptions Last Dose Informant Patient Reported? Taking? OTHER   Yes Yes   Sig: PerserVision 1 po bid for eyes    ZETIA 10 mg tablet   Yes Yes   Sig: Take 10 mg by mouth daily. Patient states he takes at bedtime   amLODIPine (NORVASC) 5 mg tablet   No Yes   Sig: Take 1 Tab by mouth daily. aspirin 81 mg chewable tablet   Yes Yes   Sig: Take 1 Tab by mouth daily. clopidogrel (PLAVIX) 75 mg tablet   Yes Yes   Sig: Take 75 mg by mouth daily.    losartan (COZAAR) 50 mg tablet   Yes Yes   Sig: Take 50 mg by mouth daily. meloxicam (MOBIC) 15 mg tablet   Yes Yes   Sig: Take 15 mg by mouth two (2) times a day. metoprolol succinate (TOPROL-XL) 25 mg XL tablet   No Yes   Sig: Take 0.5 Tabs by mouth daily. Patient taking differently: Take 12.5 mg by mouth nightly. nitroglycerin (NITROSTAT) 0.4 mg SL tablet   No No   Si Tab by SubLINGual route every five (5) minutes as needed for Chest Pain. raNITIdine (ZANTAC) 150 mg tablet   Yes Yes   Sig: Take 150 mg by mouth nightly. rosuvastatin (CRESTOR) 40 mg tablet   Yes Yes   Sig: Take 40 mg by mouth nightly. Facility-Administered Medications: None         Objective:     No intake or output data in the 24 hours ending 10/28/19 2210   Temp (24hrs), Av.1 °F (36.7 °C), Min:98.1 °F (36.7 °C), Max:98.1 °F (36.7 °C)    Oxygen Therapy  O2 Sat (%): 95 % (10/28/19 2101)  Pulse via Oximetry: 59 beats per minute (10/28/19 2101)   Body mass index is 27.34 kg/m². Patient Vitals for the past 24 hrs:   Temp Pulse Resp BP SpO2   10/28/19 2101  (!) 58 14 164/74 95 %   10/28/19 2031 98.1 °F (36.7 °C) 61 20 175/81 95 %     Physical Exam:    General:    WD and WN, No apparent distress. Head:   Normocephalic, without obvious abnormality, atraumatic. Eyes:  PERRL; EOMI; sclera normal/non-icteric;  Vision loss of most of left eye - he can see some in LLQ of peripheral visual field  ENT:  Hearing is normal.  Oropharynx is clear with tacky mucous membranes   Resp:    Clear to auscultation bilaterally. No Wheezing or Rhonchi. Resp are even and unlabored  Heart[de-identified]  Regular rate and rhythm,  no murmur,   No LE edema  Abdomen:   Soft, non-tender. Not distended. Bowel sounds normal.  hepato-splenomegaly-none   Musc/SK: Muscle strength is good and tone normal; No cyanosis. No clubbing  Skin:     Texture, turgor normal. No significant rashes or lesions.      Capillary refill < 2 sec  Neurologic: CN II - XII are grossly intact - Eye exam as noted above  Psych: Alert and oriented x 4;  Judgement and insight are normal     Data Review:   Recent Results (from the past 24 hour(s))   GLUCOSE, POC    Collection Time: 10/28/19  8:35 PM   Result Value Ref Range    Glucose (POC) 81 65 - 100 mg/dL   POC PT/INR    Collection Time: 10/28/19  8:38 PM   Result Value Ref Range    Prothrombin time (POC) 12.4 (H) 9.6 - 11.6 SECS    INR (POC) 1.0 0.9 - 1.2     CBC WITH AUTOMATED DIFF    Collection Time: 10/28/19  8:40 PM   Result Value Ref Range    WBC 8.8 4.3 - 11.1 K/uL    RBC 4.30 4.23 - 5.6 M/uL    HGB 13.9 13.6 - 17.2 g/dL    HCT 41.4 41.1 - 50.3 %    MCV 96.3 79.6 - 97.8 FL    MCH 32.3 26.1 - 32.9 PG    MCHC 33.6 31.4 - 35.0 g/dL    RDW 13.2 11.9 - 14.6 %    PLATELET 322 586 - 004 K/uL    MPV 9.1 (L) 9.4 - 12.3 FL    ABSOLUTE NRBC 0.00 0.0 - 0.2 K/uL    DF AUTOMATED      NEUTROPHILS 58 43 - 78 %    LYMPHOCYTES 25 13 - 44 %    MONOCYTES 12 4.0 - 12.0 %    EOSINOPHILS 4 0.5 - 7.8 %    BASOPHILS 1 0.0 - 2.0 %    IMMATURE GRANULOCYTES 0 0.0 - 5.0 %    ABS. NEUTROPHILS 5.1 1.7 - 8.2 K/UL    ABS. LYMPHOCYTES 2.2 0.5 - 4.6 K/UL    ABS. MONOCYTES 1.0 0.1 - 1.3 K/UL    ABS. EOSINOPHILS 0.4 0.0 - 0.8 K/UL    ABS. BASOPHILS 0.1 0.0 - 0.2 K/UL    ABS. IMM. GRANS. 0.0 0.0 - 0.5 K/UL   METABOLIC PANEL, COMPREHENSIVE    Collection Time: 10/28/19  8:40 PM   Result Value Ref Range    Sodium 142 136 - 145 mmol/L    Potassium 3.7 3.5 - 5.1 mmol/L    Chloride 111 (H) 98 - 107 mmol/L    CO2 24 21 - 32 mmol/L    Anion gap 7 7 - 16 mmol/L    Glucose 83 65 - 100 mg/dL    BUN 23 8 - 23 MG/DL    Creatinine 1.13 0.8 - 1.5 MG/DL    GFR est AA >60 >60 ml/min/1.73m2    GFR est non-AA >60 >60 ml/min/1.73m2    Calcium 8.7 8.3 - 10.4 MG/DL    Bilirubin, total 0.4 0.2 - 1.1 MG/DL    ALT (SGPT) 27 12 - 65 U/L    AST (SGOT) 28 15 - 37 U/L    Alk.  phosphatase 67 50 - 136 U/L    Protein, total 7.4 6.3 - 8.2 g/dL    Albumin 3.8 3.2 - 4.6 g/dL    Globulin 3.6 (H) 2.3 - 3.5 g/dL    A-G Ratio 1.1 (L) 1.2 - 3.5     C REACTIVE PROTEIN, QT    Collection Time: 10/28/19  8:40 PM   Result Value Ref Range    C-Reactive protein <0.3 0.0 - 0.9 mg/dL   EKG, 12 LEAD, INITIAL    Collection Time: 10/28/19  8:59 PM   Result Value Ref Range    Ventricular Rate 59 BPM    Atrial Rate 59 BPM    P-R Interval 176 ms    QRS Duration 108 ms    Q-T Interval 420 ms    QTC Calculation (Bezet) 415 ms    Calculated P Axis 102 degrees    Calculated R Axis 7 degrees    Calculated T Axis 43 degrees    Diagnosis       !!! Poor data quality, interpretation may be adversely affected  Sinus bradycardia  Non-specific ST-t wave changes  When compared with ECG of 24-NOV-2018 14:57,  No significant change was found  Confirmed by ST KIRILL FIELD MD (), AIDAN HERRING ([id number]) on 10/28/2019 9:47:14 PM       CXR Results  (Last 48 hours)    None        CT Results  (Last 48 hours)               10/28/19 2045  CT CODE NEURO HEAD WO CONTRAST Final result    Impression:  IMPRESSION:       No acute abnormalities. Date of Dictation: 10/28/2019 8:51 PM           Narrative:  CT HEAD WITHOUT CONTRAST        HISTORY:  Left amaurosis fugax. COMPARISON: None       TECHNIQUE: Axial imaging was performed without intravenous contrast utilizing   5mm slice thickness. Sagittal and coronal reformats were performed. Radiation   dose reduction techniques were used for this study. Our CT scanner uses one or   all of the following:       Automated exposure control, adjustment of the MAS or KUB according to patient's   size and iterative reconstruction. FINDINGS:           *BRAIN:       -  There are no early signs of territorial or lacunar infarction by CT.      -  No intracranial mass, hematoma, or hydrocephalus. -  For patient's age, the scattered areas of white matter hypodensities may   represent a chronic small vessel white matter ischemia. However this is   nonspecific. *VISUALIZED PARANASAL SINUSES: Well aerated. *MASTOIDS:  Clear.    *CALVARIUM AND SCALP: Unremarkable. Assessment and Plan: Active Hospital Problems    Diagnosis Date Noted    Vision loss of left eye 10/28/2019    Bilateral carotid artery disease ([facility] 10/09/2018    Essential hypertension with goal blood pressure less than 130/85 02/17/2017     Principal Problem:    Vision loss of left eye (10/28/2019)    Will proceed with CVA workup as advised by his ophthalmologist; MRI and echo; no need to repeat carotid doppler at this time. No need for PT/OT/ST    Active Problems:    Essential hypertension with goal blood pressure less than 130/85 (2/17/2017)    Continue home meds and add prn hydralazine, if needed. Bilateral carotid artery disease ([facility] (10/9/2018)    Chronic condition is stable, but may affect hospital stay; continue home medications of aspirin/plavix      · PLAN General  ·   · Cont appropriate home meds (see MAR)  · Control symptoms (pain, n/v, fever, etc)  · Monitor appropriate labs   · DVT prophylaxis:  Lovenox  · Code status: Full;  HCPOA:   · Risk: high  · Anticipated DC needs:  · Estimated LOS:  less than 2 midnights  · Plans discussed with patient and/or caregiver; questions answered.       Med records reviewed if applicable; findings:     Critical care time if applicable:      Signed By: Marv Burt MD     October 28, 2019

## 2019-10-29 NOTE — DISCHARGE SUMMARY
Discharge Summary   Patient ID:  Rajwinder Cobos  [id number]  48 y.o.  1935  Admit date: 10/28/2019  8:27 PM  Discharge date and time: 10/29/2019  Attending: Charan Ngo MD  PCP:  Lynsey Jackson MD  Treatment Team: Attending Provider: Charan Ngo MD; Utilization Review: Erasmo Mejias RN  Principal Diagnosis Vision loss of left eye   Principal Problem:    Vision loss of left eye (10/28/2019)    Active Problems:    Essential hypertension with goal blood pressure less than 130/85 (2/17/2017)      Bilateral carotid artery disease ([facility] [address] (10/9/2018)       * Admission Diagnoses: Vision loss of left eye [H54.62]  * Discharge Diagnoses:    Hospital Problems as of 10/29/2019 Date Reviewed: 12/17/2018          Codes Class Noted - Resolved POA    * (Principal) Vision loss of left eye ICD-10-CM: H54.62  ICD-9-CM: 369.8  10/28/2019 - Present Yes        Bilateral carotid artery disease ([facility] ICD-10-CM: I73.9  ICD-9-CM: 447.9  10/9/2018 - Present Yes        Essential hypertension with goal blood pressure less than 130/85 ICD-10-CM: I10  ICD-9-CM: 401.9  2/17/2017 - Present Yes                Hospital Course:    Mr. Lev Lr is a 81 yo male with PMH of CAD with stents, HTN, PAF, PVD, ventricular fibrillation, bilateral carotid artery stenosis, CKD 2, dyslipidemia, wet macular degeneration right eye who presented from his Ophthalmologist office with c/o vision loss left eye. He reports he was reading and developed blurry vision followed by acute loss of vision of left eye, not painful. He was able to obtain an appt with his Ophthalmologist at his office and was sent to the ER for CVA r/o. Denied other neurological changes. Denies photophobia, trauma, irritation, drainage. CT head without acute findings. He reports recent carotid US however last US shown was in December 2019, unable to access results. Echo with EF 55-60%, DD present. Carotid US shows no acute findings.   Pt wishing to leave AMA, upset MRI has not been done yet. Explained to pt risks of leaving including complete vision loss, death, pt verbalized understanding. Diagnostic Study/Procedure results summary copied from within [facility] EMR:    [address] [facility]  [address] [facility], [address] [facility]  [phone]    Transthoracic Echocardiogram  2D, M-mode, Doppler, and Color Doppler    Patient: Alfonso Grijalva  MR #: [id number]  : 1935  Age: 80 years  Gender: Male  Study date: 29-Oct-2019  Account #: [de-identified]  Height: 71 in  Weight: 195.6 lb  BSA: 2.09 mï¾²  Status:Routine  Location: 215  BP: 179/ 83    Allergies: TREE NUT, CODEINE, PENICILLINS    Sonographer: Tyrone Ingram RDCS  Group:  [facility]  Referring Physician: Yosi Schumacher. Ronit Patel MD  Reading Physician: Dora Cruz MD    INDICATIONS: CVA work up; h/o CAD    PROCEDURE: This was a routine study. A transthoracic echocardiogram was  performed. The study included complete 2D imaging, M-mode, complete spectral  Doppler, and color Doppler. Intravenous contrast (Definity, 2 ml) was  administered. Intravenous contrast (agitated saline, 9 ml) was administered. Image quality was adequate. LEFT VENTRICLE: Size was normal. Systolic function was normal. Ejection  fraction was estimated in the range of 55 % to 60 %. There were no regional  wall motion abnormalities. There was mild to moderate concentric hypertrophy. Left ventricular diastolic dysfunction was present. Average E/e' of 13.8. RIGHT VENTRICLE: The size was normal. Systolic function was normal.    LEFT ATRIUM: The atrium was mildly dilated. ATRIAL SEPTUM: There was no left-to-right shunt and no right-to-left shunt. RIGHT ATRIUM: Size was normal.    SYSTEMIC VEINS: IVC: The inferior vena cava was normal in size and course. AORTIC VALVE: The valve was structurally normal, tri-commissural. Leaflets  exhibited mild sclerosis.  There was no evidence for stenosis. There was mild  regurgitation. MITRAL VALVE: Valve structure was normal. There was no evidence for stenosis. There was mild regurgitation. TRICUSPID VALVE: The valve structure was normal. There was no evidence for  stenosis. There was mild regurgitation. PULMONIC VALVE: The valve structure was normal. There was no evidence for  stenosis. There was trace regurgitation. PERICARDIUM: There was no pericardial effusion. AORTA: The root exhibited normal size. SUMMARY:    -  Left ventricle: Systolic function was normal. Ejection fraction was  estimated in the range of 55 % to 60 %. There were no regional wall motion  abnormalities. There was mild to moderate concentric hypertrophy. -  Left atrium: The atrium was mildly dilated. -  Aortic valve: There was mild regurgitation. SYSTEM MEASUREMENT TABLES    2D mode  AoR Diam (2D): 3 cm  LA Dimension (2D): 4.2 cm  Left Atrium Systolic Volume Index; Method of Disks, Biplane; 2D mode;: 34   ml/m2  IVS/LVPW (2D): 1  IVSd (2D): 1.5 cm  LVIDd (2D): 5.1 cm  LVIDs (2D): 3.3 cm  LVPWd (2D): 1.5 cm  RVIDd (2D): 3.1 cm    Unspecified Scan Mode  Peak Grad; Mean; Antegrade Flow: 11 mm[Hg]  Vmax; Antegrade Flow: 174 cm/s    Prepared and signed by    Chantale Gonzales MD  Signed 29-Oct-2019 12:40:45      CT HEAD WITHOUT CONTRAST      HISTORY:  Left amaurosis fugax.     COMPARISON: None     TECHNIQUE: Axial imaging was performed without intravenous contrast utilizing  5mm slice thickness. Sagittal and coronal reformats were performed. Radiation  dose reduction techniques were used for this study. Our CT scanner uses one or  all of the following:     Automated exposure control, adjustment of the MAS or KUB according to patient's  size and iterative reconstruction.     FINDINGS:         *BRAIN:      -  There are no early signs of territorial or lacunar infarction by CT.     -  No intracranial mass, hematoma, or hydrocephalus.       -  For patient's age, the scattered areas of white matter hypodensities may  represent a chronic small vessel white matter ischemia. However this is  nonspecific.     *VISUALIZED PARANASAL SINUSES: Well aerated. *MASTOIDS:  Clear. *CALVARIUM AND SCALP: Unremarkable.        IMPRESSION  IMPRESSION:     No acute abnormalities.         HISTORY: Vision problems     FINDINGS:     Duplex doppler carotid ultrasound exams performed of both the right and left  side of the neck. NASCET criteria.     Peak systolic velocity right common carotid artery 96 cm/s, right internal  carotid of 98 cm/s with a ratio of 1.0 on the right. Right internal carotid  artery end-diastolic velocity of 28 cm/s.     Peak systolic velocity left common carotid artery 108 cm/s, internal carotid of  89 cm/s with a ratio of 0.8 on the left. Left internal carotid artery  end-diastolic velocity of 20 cm/s.     Antegrade flow right vertebral artery and antegrade flow left vertebral artery.     Grayscale and color-flow imaging reveal bilateral carotid artery  atherosclerosis.     IMPRESSION  IMPRESSION:     Bilateral carotid artery atherosclerosis. However, no hemodynamically  significant internal carotid artery stenosis.               Labs: Results:       Chemistry Recent Labs     10/29/19  0501 10/28/19  2040   GLU 77 83    142   K 3.9 3.7   * 111*   CO2 25 24   BUN 21 23   CREA 1.03 1.13   CA 8.7 8.7   AGAP 6* 7   TBILI  --  0.4   ALT  --  27   AP  --  67   TP  --  7.4   ALB  --  3.8   GLOB  --  3.6*   AGRAT  --  1.1*      CBC w/Diff Recent Labs     10/29/19  0501 10/28/19  2040   WBC 6.5 8.8   RBC 4.04* 4.30   HGB 13.0* 13.9   HCT 39.2* 41.4    183   GRANS  --  58   LYMPH  --  25   EOS  --  4      Cardiac Enzymes No results for input(s): CPK, CKND1, JOSE in the last 72 hours.     No lab exists for component: CKRMB, TROIP   Coagulation Recent Labs     10/28/19  2038   INR 1.0       Lipid Panel @BRIEFLAB(CHOL,CHOLPOCT,825097,244006,[id number],CHOLX,CHOLP,CHLST,CHOLV,847260,HDL,HDLPOC,HDLPOCT,130499,NHDLCT,[id number],HDLC,HDLP,LDL,LDLPOCT,LDLCPOC,890449,NLDLCT,DLDL,LDLC,DLDLP,858321,VLDLC,VLDL,TGL,TGLX,TRIGL,[id number],TRIGP,TGLPOCT,607120,056417,CHHD,CHHDX)@   BNP No results for input(s): BNPP in the last 72 hours. Liver Enzymes Recent Labs     10/28/19  2040   TP 7.4   ALB 3.8   AP 67   SGOT 28      Thyroid Studies Lab Results   Component Value Date/Time    TSH 5.290 (H) 10/29/2019 05:01 AM            Discharge Exam:  Visit Vitals  /85   Pulse (!) 53   Temp 98.1 °F (36.7 °C)   Resp 16   Ht 5' 11\" (1.803 m)   Wt 88.9 kg (196 lb)   SpO2 96%   BMI 27.34 kg/m²       General:       No acute distress    Lungs:          CTA bilaterally. Resp even and nonlabored  Heart:            S1S2 present without murmurs rubs gallops. RRR. No LE edema  Abdomen:    Soft, non tender, non distended. BS present  Extremities: Moves ext spontaneously. No cyanosis  Neurologic:  A/O X4. Loss of vision left eye. Decrease vision right eye that is chronic. Peripheral vision decreased both eyes. EOMs intact. Bilateral UE/LE strength 5/5 without drift. Speech clear. Sensation intact. Coordination intact. Disposition: AMA, home   Discharge Condition: stable  Patient Instructions:   Current Discharge Medication List      CONTINUE these medications which have NOT CHANGED    Details   !! OTHER by IntraMUSCular route every month. Indications: pollen, grass and cat allergy shot. Unsure of name/dose      losartan (COZAAR) 50 mg tablet Take 50 mg by mouth daily. raNITIdine (ZANTAC) 150 mg tablet Take 150 mg by mouth nightly. metoprolol succinate (TOPROL-XL) 25 mg XL tablet Take 0.5 Tabs by mouth daily. Qty: 90 Tab, Refills: 3      !! OTHER Take  by mouth two (2) times a day. PerserVision 1 po bid for eyes   Indications: viteyes      amLODIPine (NORVASC) 5 mg tablet Take 1 Tab by mouth daily.   Qty: 90 Tab, Refills: 3 clopidogrel (PLAVIX) 75 mg tablet Take 75 mg by mouth daily. ZETIA 10 mg tablet Take 10 mg by mouth daily. Patient states he takes at bedtime  Refills: 3      aspirin 81 mg chewable tablet Take 1 Tab by mouth daily. rosuvastatin (CRESTOR) 40 mg tablet Take 40 mg by mouth nightly. nitroglycerin (NITROSTAT) 0.4 mg SL tablet 1 Tab by SubLINGual route every five (5) minutes as needed for Chest Pain. Qty: 25 Tab, Refills: 11       !! - Potential duplicate medications found. Please discuss with provider. STOP taking these medications       meloxicam (MOBIC) 15 mg tablet Comments:   Reason for Stopping:               Activity: no driving until cleared by Ophthalmology   Diet: Cardiac Diet  Wound Care: None needed      Full Code   Surrogate decision maker:  None reported    Pneumonia and flu vaccine to be administered at discharge per hospital protocol   Follow-up  ·   FU PCP 2 days  · FU Cardiology 1-2 days  ·  FU Ophthalmology as scheduled 11/4/19  · Pt understands not to drive until cleared by PCP, Ophthalmology, Cards. Also understands risk of not limited to complete vision loss, death if leaving prior to work up completed.        Notes, labs, VS, diagnostic testing reviewed  Case discussed with pt, care team, Dr. Elizabeth Bejarano       Time spent to discharge patient  45 min   Signed:  Dominick Cantrell NP  10/29/2019  2:52 PM

## 2019-10-29 NOTE — PROGRESS NOTES
This patient is well known to me. 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 He presented to my office last night with an acute CRAO OS. I did an AC paracentesis but it has not helped his vision which remains at [facility] temporally. He has a strong APD OS. His CT scan was normal last night. He had a carotid ulrasound and a cardiac ECHO today-the results are pending. He is currently on anticoagulants. I will see him again on Monday 11/4/19 for his next Avastin injection OD and I will recheck the left eye then.

## 2019-10-29 NOTE — PROGRESS NOTES
UPDATE 3:58pm: Pt with discharge orders this day. No needs voiced at discharge. Pt to return home with spouse. Milestones met. Care Management Interventions  PCP Verified by CM: Petra Snow )  Mode of Transport at Discharge: Other (see comment)(wife)  Transition of Care Consult (CM Consult): Discharge Planning  Discharge Durable Medical Equipment: No  Physical Therapy Consult: No  Occupational Therapy Consult: No  Speech Therapy Consult: No  Current Support Network: Own Home, Lives with Spouse  Confirm Follow Up Transport: Family  Plan discussed with Pt/Family/Caregiver: Yes  Freedom of Choice Offered: Yes  Discharge Location  Discharge Placement: Home      This CM met with pt at bedside this day to complete assessment. Pt verified his PCP, insurance, emergency contact, and home address. He reports no difficulty obtaining his medications in the community. He lives at home with spouse with steps to enter. He reports no home DME. He confirms that at baseline prior to admission he has been independent with his ADLs including bathing, dressing, cooking, and driving. We discussed discharge planning and at this time he plans on returning home with spouse when stable for discharge. No needs voiced to CM during our conversation. Will continue to follow.

## 2019-10-29 NOTE — ED TRIAGE NOTES
Patient states loss of vision in left eye. States this started around 1830. Patient was evaluated by his eye doc and was told to come here.

## 2019-10-29 NOTE — ED NOTES
TRANSFER - OUT REPORT:    Verbal report given to Joi Morales RN(name) on Ignacio Dillard  being transferred to 2nd floor(unit) for routine progression of care       Report consisted of patients Situation, Background, Assessment and   Recommendations(SBAR). Information from the following report(s) SBAR and ED Summary was reviewed with the receiving nurse. Lines:   Peripheral IV 10/28/19 Right Antecubital (Active)   Site Assessment Clean, dry, & intact 10/28/2019  8:38 PM   Phlebitis Assessment 0 10/28/2019  8:38 PM   Infiltration Assessment 0 10/28/2019  8:38 PM   Dressing Status Clean, dry, & intact 10/28/2019  8:38 PM   Dressing Type 4 X 4;Transparent 10/28/2019  8:38 PM   Hub Color/Line Status Pink 10/28/2019  8:38 PM        Opportunity for questions and clarification was provided.       Patient transported with:   Insem Spa

## 2019-10-29 NOTE — PROGRESS NOTES
10/28/19 0761   Dual Skin Pressure Injury Assessment   Dual Skin Pressure Injury Assessment WDL   Second Care Provider (Based on Facility Policy) Shi Regan RN   Skin Integumentary   Skin Integumentary (WDL) WDL   Skin Color Appropriate for ethnicity   Skin Integrity Scars (comment); Intact   Skin Condition/Temp Warm;Dry       No pressure injuries noted.

## 2019-10-29 NOTE — PROGRESS NOTES
Progress Note    10/29/2019  Admit Date: 10/28/2019  8:27 PM   NAME: Erasmo Nova   :  1935   MRN:  [id number]   Attending: Ade Chatterjee MD  PCP:  Tadeo Ortiz MD  Treatment Team: Attending Provider: Ade Chatterjee MD    Full Code   SUBJECTIVE:   Mr. Ravinder Kumar is a 81 yo male with PMH of CAD with stents, HTN, PAF, PVD, ventricular fibrillation, bilateral carotid artery stenosis, CKD 2, dyslipidemia, wet macular degeneration right eye who presented from his Ophthalmologist office with c/o vision loss left eye. He reports he was reading and developed blurry vision followed by acute loss of vision of left eye, not painful. He was able to obtain an appt with his Ophthalmologist at his office and was sent to the ER for CVA r/o. Denied other neurological changes. Denies photophobia, trauma, irritation, drainage. CT head without acute findings. He reports recent carotid US however last US shown was in 2019, unable to access results. Today he is quite annoyed that he has not had the MRI or echo yet. States \"If I don't get it today I am leaving. \"  Explained importance of staying to complete work up. Pt persistent he will leave if not completed today. He is inconvenienced by my exam. Does not want to discuss further plan of care.        Past Medical History:   Diagnosis Date    Acute myocardial infarction of other inferior wall, initial episode of care 3/18/2014    Arthritis 2018    Asthma     as a child    Bradycardia 2018    CAD (coronary artery disease)     stent x 11    Claudication of gluteal region [facility]) 11/15/2018    Claudication of left lower extremity ([facility] [place] [address] 2018    Coronary artery disease involving native coronary artery of native heart without angina pectoris 2016    Dyslipidemia 3/18/2014    Dyspnea 2016    History of PTCA 2016    Hypertension     Iliac artery stenosis, left ([address] 2018    Myocardial infarction of inferior wall ([facility] [address] 7/11/2016    Nausea & vomiting     Paroxysmal atrial fibrillation (HCC) 6/29/2016    PVD [facility]peripheral vascular disease) [facility])     stent in lt leg    Renal insufficiency 6/29/2016    S/P coronary artery stent placement(cutting ballon to mLAD, 3.0x18 Xience & 3.5x18 Xience to dRCA and 4.0x12 Ion to ostial RCA on 1/9/2012) 1/10/2012    S/P PTCA/STENT 1/9/2012    OV:10/15/15 6 stents to RCA 3/18/14 for RCA thrombotic occlusion. Prior LAD stent 2004. 1/9/12 -95% LAD stent ISR that was treated with cutting balloon atherectomy. Two Xience stents to the distal RCA and 4.0 Ion stent to the proximal RCA.  VF (ventricular fibrillation) ([facility] [address] 3/18/2014     Recent Results (from the past 24 hour(s))   GLUCOSE, POC    Collection Time: 10/28/19  8:35 PM   Result Value Ref Range    Glucose (POC) 81 65 - 100 mg/dL   POC PT/INR    Collection Time: 10/28/19  8:38 PM   Result Value Ref Range    Prothrombin time (POC) 12.4 (H) 9.6 - 11.6 SECS    INR (POC) 1.0 0.9 - 1.2     CBC WITH AUTOMATED DIFF    Collection Time: 10/28/19  8:40 PM   Result Value Ref Range    WBC 8.8 4.3 - 11.1 K/uL    RBC 4.30 4.23 - 5.6 M/uL    HGB 13.9 13.6 - 17.2 g/dL    HCT 41.4 41.1 - 50.3 %    MCV 96.3 79.6 - 97.8 FL    MCH 32.3 26.1 - 32.9 PG    MCHC 33.6 31.4 - 35.0 g/dL    RDW 13.2 11.9 - 14.6 %    PLATELET 724 119 - 014 K/uL    MPV 9.1 (L) 9.4 - 12.3 FL    ABSOLUTE NRBC 0.00 0.0 - 0.2 K/uL    DF AUTOMATED      NEUTROPHILS 58 43 - 78 %    LYMPHOCYTES 25 13 - 44 %    MONOCYTES 12 4.0 - 12.0 %    EOSINOPHILS 4 0.5 - 7.8 %    BASOPHILS 1 0.0 - 2.0 %    IMMATURE GRANULOCYTES 0 0.0 - 5.0 %    ABS. NEUTROPHILS 5.1 1.7 - 8.2 K/UL    ABS. LYMPHOCYTES 2.2 0.5 - 4.6 K/UL    ABS. MONOCYTES 1.0 0.1 - 1.3 K/UL    ABS. EOSINOPHILS 0.4 0.0 - 0.8 K/UL    ABS. BASOPHILS 0.1 0.0 - 0.2 K/UL    ABS. IMM.  GRANS. 0.0 0.0 - 0.5 K/UL   METABOLIC PANEL, COMPREHENSIVE    Collection Time: 10/28/19  8:40 PM   Result Value Ref Range    Sodium 142 136 - 145 mmol/L    Potassium 3.7 3.5 - 5.1 mmol/L    Chloride 111 (H) 98 - 107 mmol/L    CO2 24 21 - 32 mmol/L    Anion gap 7 7 - 16 mmol/L    Glucose 83 65 - 100 mg/dL    BUN 23 8 - 23 MG/DL    Creatinine 1.13 0.8 - 1.5 MG/DL    GFR est AA >60 >60 ml/min/1.73m2    GFR est non-AA >60 >60 ml/min/1.73m2    Calcium 8.7 8.3 - 10.4 MG/DL    Bilirubin, total 0.4 0.2 - 1.1 MG/DL    ALT (SGPT) 27 12 - 65 U/L    AST (SGOT) 28 15 - 37 U/L    Alk.  phosphatase 67 50 - 136 U/L    Protein, total 7.4 6.3 - 8.2 g/dL    Albumin 3.8 3.2 - 4.6 g/dL    Globulin 3.6 (H) 2.3 - 3.5 g/dL    A-G Ratio 1.1 (L) 1.2 - 3.5     SED RATE, AUTOMATED    Collection Time: 10/28/19  8:40 PM   Result Value Ref Range    Sed rate, automated 14 0 - 20 mm/hr   C REACTIVE PROTEIN, QT    Collection Time: 10/28/19  8:40 PM   Result Value Ref Range    C-Reactive protein <0.3 0.0 - 0.9 mg/dL   EKG, 12 LEAD, INITIAL    Collection Time: 10/28/19  8:59 PM   Result Value Ref Range    Ventricular Rate 59 BPM    Atrial Rate 59 BPM    P-R Interval 176 ms    QRS Duration 108 ms    Q-T Interval 420 ms    QTC Calculation (Bezet) 415 ms    Calculated P Axis 102 degrees    Calculated R Axis 7 degrees    Calculated T Axis 43 degrees    Diagnosis       !!! Poor data quality, interpretation may be adversely affected  Sinus bradycardia  Non-specific ST-t wave changes  When compared with ECG of 24-NOV-2018 14:57,  No significant change was found  Confirmed by ST KIRILL FIELD MD (), AIDAN HERRING ([id number]) on 10/28/2019 9:47:14 PM     CBC W/O DIFF    Collection Time: 10/29/19  5:01 AM   Result Value Ref Range    WBC 6.5 4.3 - 11.1 K/uL    RBC 4.04 (L) 4.23 - 5.6 M/uL    HGB 13.0 (L) 13.6 - 17.2 g/dL    HCT 39.2 (L) 41.1 - 50.3 %    MCV 97.0 79.6 - 97.8 FL    MCH 32.2 26.1 - 32.9 PG    MCHC 33.2 31.4 - 35.0 g/dL    RDW 13.2 11.9 - 14.6 %    PLATELET 601 263 - 308 K/uL    MPV 9.3 (L) 9.4 - 12.3 FL    ABSOLUTE NRBC 0.00 0.0 - 0.2 K/uL   LIPID PANEL    Collection Time: 10/29/19  5:01 AM Result Value Ref Range    LIPID PROFILE          Cholesterol, total 121 <200 MG/DL    Triglyceride 69 35 - 150 MG/DL    HDL Cholesterol 60 40 - 60 MG/DL    LDL, calculated 47.2 <100 MG/DL    VLDL, calculated 13.8 6.0 - 23.0 MG/DL    CHOL/HDL Ratio 2.0     HEMOGLOBIN A1C WITH EAG    Collection Time: 10/29/19  5:01 AM   Result Value Ref Range    Hemoglobin A1c 5.7 4.8 - 6.0 %    Est. average glucose 117 mg/dL   TSH 3RD GENERATION    Collection Time: 10/29/19  5:01 AM   Result Value Ref Range    TSH 5.290 (H) 0.358 - 3.740 uIU/mL   T4, FREE    Collection Time: 10/29/19  5:01 AM   Result Value Ref Range    T4, Free 0.8 0.78 - 3.42 NG/DL   METABOLIC PANEL, BASIC    Collection Time: 10/29/19  5:01 AM   Result Value Ref Range    Sodium 144 136 - 145 mmol/L    Potassium 3.9 3.5 - 5.1 mmol/L    Chloride 113 (H) 98 - 107 mmol/L    CO2 25 21 - 32 mmol/L    Anion gap 6 (L) 7 - 16 mmol/L    Glucose 77 65 - 100 mg/dL    BUN 21 8 - 23 MG/DL    Creatinine 1.03 0.8 - 1.5 MG/DL    GFR est AA >60 >60 ml/min/1.73m2    GFR est non-AA >60 >60 ml/min/1.73m2    Calcium 8.7 8.3 - 10.4 MG/DL   MAGNESIUM    Collection Time: 10/29/19  5:01 AM   Result Value Ref Range    Magnesium 2.3 1.8 - 2.4 mg/dL   GLUCOSE, POC    Collection Time: 10/29/19  7:44 AM   Result Value Ref Range    Glucose (POC) 96 65 - 100 mg/dL     Allergies   Allergen Reactions    Nuts [Tree Nut] Anaphylaxis    Codeine Hives    Penicillins Angioedema     Current Facility-Administered Medications   Medication Dose Route Frequency Provider Last Rate Last Dose    amLODIPine (NORVASC) tablet 5 mg  5 mg Oral DAILY Ramon Streeter MD        aspirin chewable tablet 81 mg  81 mg Oral DAILY Ramon Streeter MD        clopidogrel (PLAVIX) tablet 75 mg  75 mg Oral DAILY Ramon Streeter MD        losartan (COZAAR) tablet 50 mg  50 mg Oral DAILY Ramon Streeter MD        metoprolol succinate (TOPROL-XL) XL tablet 12.5 mg  12.5 mg Oral QHS Laila Kangs Kain Calderon MD   12.5 mg at 10/28/19 2350    rosuvastatin (CRESTOR) tablet 40 mg  40 mg Oral QHS Ney Myers MD   40 mg at 10/28/19 2350    ezetimibe (ZETIA) tablet 10 mg  10 mg Oral DAILY Ney Myers MD        sodium chloride (NS) flush 5-40 mL  5-40 mL IntraVENous Q8H Lady Pricilla Streeter MD        sodium chloride (NS) flush 5-40 mL  5-40 mL IntraVENous PRN Lady Pricilla Canales MD        ondansetron [facility]) injection 4 mg  4 mg IntraVENous Q6H PRN Ney Myers MD        aspirin tablet 325 mg  325 mg Oral DAILY Ney Myers MD        acetaminophen (TYLENOL) tablet 650 mg  650 mg Oral Q4H PRN Ney Myers MD        bisacodyl (DULCOLAX) tablet 5 mg  5 mg Oral DAILY PRN Ney Myers MD        enoxaparin (LOVENOX) injection 40 mg  40 mg SubCUTAneous Q24H Lady Pricilla Streeter MD        famotidine (PEPCID) tablet 20 mg  20 mg Oral BID Ney Myers MD   20 mg at 10/29/19 0000       Review of Systems negative with exception of pertinent positives noted above  PHYSICAL EXAM     Visit Vitals  /83   Pulse (!) 50   Temp 98 °F (36.7 °C)   Resp 18   Ht 5' 11\" (1.803 m)   Wt 88.9 kg (196 lb)   SpO2 96%   BMI 27.34 kg/m²      Temp (24hrs), Av °F (36.7 °C), Min:98 °F (36.7 °C), Max:98.1 °F (36.7 °C)    Oxygen Therapy  O2 Sat (%): 96 % (10/28/19 2317)  Pulse via Oximetry: 61 beats per minute (10/28/19 2242)  O2 Device: Room air (10/28/19 2305)  No intake or output data in the 24 hours ending 10/29/19 0848   General: No acute distress    Lungs: CTA bilaterally. Resp even and nonlabored  Heart:  S1S2 present without murmurs rubs gallops. RRR. No LE edema  Abdomen: Soft, non tender, non distended. BS present  Extremities: Moves ext spontaneously. No cyanosis  Neurologic:  A/O X4. Loss of vision left eye. Decrease vision right eye that is chronic. Peripheral vision decreased both eyes. EOMs intact. Bilateral UE/LE strength 5/5 without drift.  Speech clear. Sensation intact. Coordination intact.      Results summary of Diagnostic Studies/Procedures copied from within [facility] EMR:        Justice Garner 96 Problems    Diagnosis Date Noted    Vision loss of left eye 10/28/2019    Bilateral carotid artery disease ([address] 10/09/2018    Essential hypertension with goal blood pressure less than 130/85 02/17/2017     Plan:    Vision loss left eye  R/O CVA  CT head without acute findings  Continue ASA, plavix, statin  MRI brain pending  Echo pending  Will add carotid US, last one was approx 1 year ago however no access to results  Lipid panel ok  A1C 5.7    HTN  Elevated, will allow for permission HTN X 24 hours until CVA r/o  Continue home meds, may need to increase after 24 hours if still elevated BP    PAF  Continue metoprolol  NSR current    Hyperlipidemia  Lipid panel ok  Continue statin    CAD s/p stents  Continue ASA, plavix, BB  No active CP        Notes, labs, VS, diagnostic testing reviewed  Time spent with pt 20 min      DVT Prophylaxis: lovenox  Plan of Care Discussed with: Supervising MD Dr. David Cedillo, care team, pt      Olivia Andrade NP

## 2019-10-29 NOTE — ED PROVIDER NOTES
Patient is an 80-year-old male with coronary artery disease who is brought to the emergency department today by his ophthalmologist.  Patient states that around 630 this evening while reading in his home he started having blurry vision in the left eye and then loss of vision of the left eye. There is no pain. No injury or trauma. He does have chronic blurry vision on the right side from macular degeneration. He called his eye doctor went to the office and they examined him for concern for retinal artery occlusion tried to drain fluid multiple times with no relief and then brought him here and the charge nurse was instructed by the ophthalmologist to call a stroke alert. The history is provided by the patient. Loss of Vision    This is a new problem. The current episode started 3 to 5 hours ago. The problem occurs constantly. The problem has not changed since onset. The left eye is affected. The injury mechanism was none. The patient is experiencing no pain. Associated symptoms include decreased vision. Pertinent negatives include no discharge, no foreign body sensation, no photophobia, no nausea, no weakness, no fever and no pain.         Past Medical History:   Diagnosis Date    Asthma     as a child    CAD (coronary artery disease)     stent x 11    Dyspnea 6/29/2016    Hypertension     Nausea & vomiting     Paroxysmal atrial fibrillation ([address] 6/29/2016    PVD (peripheral vascular disease) ([address]     stent in lt leg    Renal insufficiency 6/29/2016    VF (ventricular fibrillation) ([address] 3/18/2014       Past Surgical History:   Procedure Laterality Date    CARDIAC SURG PROCEDURE UNLIST      11 stents    HX HEENT Bilateral     CA LEFT HEART CATH,PERCUTANEOUS  1/9/2012    1 stent LAD/ 3 stents RCA     VASCULAR SURGERY PROCEDURE UNLIST      lt leg         Family History:   Problem Relation Age of Onset    Heart Disease Father        Social History     Socioeconomic History    Marital status:      Spouse name: Not on file    Number of children: Not on file    Years of education: Not on file    Highest education level: Not on file   Occupational History    Not on file   Social Needs    Financial resource strain: Not on file    Food insecurity:     Worry: Not on file     Inability: Not on file    Transportation needs:     Medical: Not on file     Non-medical: Not on file   Tobacco Use    Smoking status: Former Smoker     Last attempt to quit: 1975     Years since quittin.8    Smokeless tobacco: Never Used   Substance and Sexual Activity    Alcohol use: Yes     Alcohol/week: 5.8 standard drinks     Types: 7 Shots of liquor per week    Drug use: Not on file    Sexual activity: Not on file   Lifestyle    Physical activity:     Days per week: Not on file     Minutes per session: Not on file    Stress: Not on file   Relationships    Social connections:     Talks on phone: Not on file     Gets together: Not on file     Attends Confucianist service: Not on file     Active member of club or organization: Not on file     Attends meetings of clubs or organizations: Not on file     Relationship status: Not on file    Intimate partner violence:     Fear of current or ex partner: Not on file     Emotionally abused: Not on file     Physically abused: Not on file     Forced sexual activity: Not on file   Other Topics Concern    Not on file   Social History Narrative    Not on file         ALLERGIES: Nuts [tree nut]; Codeine; and Penicillins    Review of Systems   Constitutional: Negative for chills, fatigue and fever. HENT: Negative for congestion, rhinorrhea and sore throat. Eyes: Positive for visual disturbance. Negative for photophobia, pain and discharge. Respiratory: Negative for cough and shortness of breath. Cardiovascular: Negative for chest pain and palpitations. Gastrointestinal: Negative for abdominal pain, diarrhea and nausea.    Endocrine: Negative for polydipsia and polyuria. Genitourinary: Negative for dysuria, frequency and urgency. Musculoskeletal: Negative for back pain and neck pain. Skin: Negative for rash. Neurological: Negative for seizures, syncope and weakness. Hematological: Negative. Vitals:    10/28/19 2031 10/28/19 2101   BP: 175/81 164/74   Pulse: 61 (!) 58   Resp: 20 14   Temp: 98.1 °F (36.7 °C)    SpO2: 95% 95%   Weight: 88.9 kg (196 lb)    Height: 5' 11\" (1.803 m)             Physical Exam   Constitutional: He is oriented to person, place, and time. He appears well-developed and well-nourished. HENT:   Head: Normocephalic and atraumatic. Eyes: Pupils are equal, round, and reactive to light. Conjunctivae and EOM are normal.   Neck: Normal range of motion. Neck supple. Cardiovascular: Normal rate, regular rhythm and intact distal pulses. Pulmonary/Chest: Effort normal and breath sounds normal.   Abdominal: Soft. There is no tenderness. There is no rebound and no guarding. Musculoskeletal: Normal range of motion. He exhibits no edema or tenderness. Lymphadenopathy:     He has no cervical adenopathy. Neurological: He is alert and oriented to person, place, and time. He has normal strength and normal reflexes. He displays no tremor. A cranial nerve deficit is present. No sensory deficit. He displays no seizure activity. Coordination and gait normal. GCS eye subscore is 4. GCS verbal subscore is 5. GCS motor subscore is 6. Loss of vision out of the left eye   Skin: Skin is warm and dry. No rash noted. Nursing note and vitals reviewed. MDM  Number of Diagnoses or Management Options  Diagnosis management comments: EKG shows normal sinus rhythm at a rate of 59 no acute ischemia  Blood work unremarkable  CAT scan head normal    Patient was also seen by tele-neurology.   They do not think this is a neurologic problem and it is strictly an optic problem they do not recommend tpa    9:41 PM  Discussed the case with Dr. Nael Peter of ophthalmology who is seen the patient in the office he thinks the patient also needs work-up for giant cell arteritis with CRP and sed rate which I will add on. He also requests that the patient be admitted for stroke work-up with carotid and echo due to increased risk of stroke    10:01 PM  I discussed the case with Dr. Kandi Haddad of the hospitalist service he is willing to admit for observation and will arrange for carotid ultrasound and echocardiogram in the morning. Went back and spoke with patient and his wife. Patient was initially quite agitated and angry and does not think this is a heart or stroke problem wife had to convince him to stay in the hospital.  He    Voice dictation software was used during the making of this note. This software is not perfect and grammatical and other typographical errors may be present. This note has been proofread, but may still contain errors.   Erla Hodgkins, MD; 10/28/2019 @10:01 PM   ===================================================================         Amount and/or Complexity of Data Reviewed  Clinical lab tests: ordered and reviewed  Tests in the radiology section of CPT®: ordered and reviewed  Review and summarize past medical records: yes  Discuss the patient with other providers: yes  Independent visualization of images, tracings, or specimens: yes    Risk of Complications, Morbidity, and/or Mortality  Presenting problems: moderate  Diagnostic procedures: moderate  Management options: moderate    Patient Progress  Patient progress: stable         Procedures

## 2019-10-29 NOTE — PROGRESS NOTES
TRANSFER - IN REPORT:    Verbal report received from Frances Paulino RN(name) on Ivette Monge  being received from ED(unit) for routine progression of care      Report consisted of patients Situation, Background, Assessment and   Recommendations(SBAR). Information from the following report(s) SBAR, ED Summary and Recent Results was reviewed with the receiving nurse. Opportunity for questions and clarification was provided. Assessment completed upon patients arrival to unit and care assumed.

## 2019-10-29 NOTE — PROGRESS NOTES
Patient initially not wanting MRI, had signed AMA paperwork. Nurse reports patient now agreeable to MRI and staying. I discussed MRI today and most likely D/C tomorrow am. He remains agreeable to plan. Nurse in room during conversation.

## 2019-10-30 VITALS
DIASTOLIC BLOOD PRESSURE: 67 MMHG | BODY MASS INDEX: 27.44 KG/M2 | OXYGEN SATURATION: 97 % | TEMPERATURE: 97.4 F | HEIGHT: 71 IN | HEART RATE: 53 BPM | SYSTOLIC BLOOD PRESSURE: 126 MMHG | WEIGHT: 196 LBS | RESPIRATION RATE: 18 BRPM

## 2019-10-30 LAB — T3FREE SERPL-MCNC: 2.7 PG/ML (ref 2–4.4)

## 2019-10-30 PROCEDURE — 99218 HC RM OBSERVATION: CPT

## 2019-10-30 RX ADMIN — Medication 10 ML: at 05:51

## 2019-10-30 NOTE — PROGRESS NOTES
END OF SHIFT NOTE:    INTAKE/OUTPUT  10/29 0701 - 10/30 0700  In: -   Out: 2370 [Urine:2370]  Voiding: YES  Catheter: NO  Drain:              Flatus: Patient does have flatus present. Stool:  0 occurrences. Characteristics:       Emesis: 0 occurrences. Characteristics:        VITAL SIGNS  Patient Vitals for the past 12 hrs:   Temp Pulse Resp BP SpO2   10/30/19 0425 98.4 °F (36.9 °C) (!) 55 18 166/89 98 %   10/29/19 2305 98.1 °F (36.7 °C) (!) 52 18 182/89 96 %   10/29/19 2004 98 °F (36.7 °C) (!) 55 16 155/85 98 %       Pain Assessment  Pain Intensity 1: 0 (10/29/19 1433)        Patient Stated Pain Goal: 6    Ambulating  Yes  Only deficits are visual. 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 Sinus beck on monitor with rate 40 while sleeping up to the 50's. No c/o's of pain. Shift report given to oncoming nurse at the bedside.     Anahi Martinez RN

## 2019-10-30 NOTE — DISCHARGE SUMMARY
Discharge Summary   Patient ID:  Marcial Harris  [id number]  32 y.o.  1935  Admit date: 10/28/2019  8:27 PM  Discharge date and time: 10/30/2019  Attending: Aarti Patterson MD  PCP:  Elias Rivera MD  Treatment Team: Attending Provider: Aarti Patterson MD; Utilization Review: Atilio Sena RN; Primary Nurse: Aarti Patterson RN; Care Manager: Clint Sanchez RN; Primary Nurse: Kit Staff  Principal Diagnosis Vision loss of left eye   Principal Problem:    Vision loss of left eye (10/28/2019)    Active Problems:    Essential hypertension with goal blood pressure less than 130/85 (2/17/2017)      Bilateral carotid artery disease ([facility] [address] (10/9/2018)       * Admission Diagnoses: Vision loss of left eye [H54.62]  * Discharge Diagnoses:    Hospital Problems as of 10/30/2019 Date Reviewed: 12/17/2018          Codes Class Noted - Resolved POA    * (Principal) Vision loss of left eye ICD-10-CM: H54.62  ICD-9-CM: 369.8  10/28/2019 - Present Yes        Bilateral carotid artery disease ([facility] [address] ICD-10-CM: I73.9  ICD-9-CM: 447.9  10/9/2018 - Present Yes        Essential hypertension with goal blood pressure less than 130/85 ICD-10-CM: I10  ICD-9-CM: 401.9  2/17/2017 - Present Yes                Hospital Course:  Mr. Yana Carlisle is a 79 yo male with PMH of CAD with stents, HTN, PAF, PVD, ventricular fibrillation, bilateral carotid artery stenosis, CKD 2, dyslipidemia, wet macular degeneration right eye who presented from his Ophthalmologist office with c/o vision loss left eye. Neeraj Frost reports he was reading and developed blurry vision followed by acute loss of vision of left eye, not painful. Neeraj Frost was able to obtain an appt with his Ophthalmologist at his office and was sent to the ER for CVA r/o.  Denied other neurological changes.  Denies photophobia, trauma, irritation, drainage.  CT head without acute findings.  He reports recent carotid US however last US shown was in December 2019, unable to access results.  Echo with EF 55-60%, DD present. Carotid US shows no acute findings. Diagnostic Study/Procedure results summary copied from within [facility] EMR:    [place]  [address], [address] [facility]  [phone]    Transthoracic Echocardiogram  2D, M-mode, Doppler, and Color Doppler    Patient: Mark Perry  MR #: [id number]  : 1935  Age: 80 years  Gender: Male  Study date: 29-Oct-2019  Account #: [de-identified]  Height: 71 in  Weight: 195.6 lb  BSA: 2.09 mï¾²  Status:Routine  Location: 215  BP: 179/ 83    Allergies: TREE NUT, CODEINE, PENICILLINS    Sonographer: Leonardo Skinner RDCS  Group:  [facility]  Referring Physician: Mickey Rider. Valerie Antonio MD  Reading Physician: Joey Dennis MD    INDICATIONS: CVA work up; h/o CAD    PROCEDURE: This was a routine study. A transthoracic echocardiogram was  performed. The study included complete 2D imaging, M-mode, complete spectral  Doppler, and color Doppler. Intravenous contrast (Definity, 2 ml) was  administered. Intravenous contrast (agitated saline, 9 ml) was administered. Image quality was adequate. LEFT VENTRICLE: Size was normal. Systolic function was normal. Ejection  fraction was estimated in the range of 55 % to 60 %. There were no regional  wall motion abnormalities. There was mild to moderate concentric hypertrophy. Left ventricular diastolic dysfunction was present. Average E/e' of 13.8. RIGHT VENTRICLE: The size was normal. Systolic function was normal.    LEFT ATRIUM: The atrium was mildly dilated. ATRIAL SEPTUM: There was no left-to-right shunt and no right-to-left shunt. RIGHT ATRIUM: Size was normal.    SYSTEMIC VEINS: IVC: The inferior vena cava was normal in size and course. AORTIC VALVE: The valve was structurally normal, tri-commissural. Leaflets  exhibited mild sclerosis. There was no evidence for stenosis.  There was mild  regurgitation. MITRAL VALVE: Valve structure was normal. There was no evidence for stenosis. There was mild regurgitation. TRICUSPID VALVE: The valve structure was normal. There was no evidence for  stenosis. There was mild regurgitation. PULMONIC VALVE: The valve structure was normal. There was no evidence for  stenosis. There was trace regurgitation. PERICARDIUM: There was no pericardial effusion. AORTA: The root exhibited normal size. SUMMARY:    -  Left ventricle: Systolic function was normal. Ejection fraction was  estimated in the range of 55 % to 60 %. There were no regional wall motion  abnormalities. There was mild to moderate concentric hypertrophy. -  Left atrium: The atrium was mildly dilated. -  Aortic valve: There was mild regurgitation. SYSTEM MEASUREMENT TABLES    2D mode  AoR Diam (2D): 3 cm  LA Dimension (2D): 4.2 cm  Left Atrium Systolic Volume Index; Method of Disks, Biplane; 2D mode;: 34   ml/m2  IVS/LVPW (2D): 1  IVSd (2D): 1.5 cm  LVIDd (2D): 5.1 cm  LVIDs (2D): 3.3 cm  LVPWd (2D): 1.5 cm  RVIDd (2D): 3.1 cm    Unspecified Scan Mode  Peak Grad; Mean; Antegrade Flow: 11 mm[Hg]  Vmax; Antegrade Flow: 174 cm/s    Prepared and signed by    Blank Escobar MD  Signed 29-Oct-2019 12:40:45        CT HEAD WITHOUT CONTRAST      HISTORY:  Left amaurosis fugax.     COMPARISON: None     TECHNIQUE: Axial imaging was performed without intravenous contrast utilizing  5mm slice thickness. Sagittal and coronal reformats were performed. Radiation  dose reduction techniques were used for this study.  Our CT scanner uses one or  all of the following:     Automated exposure control, adjustment of the MAS or KUB according to patient's  size and iterative reconstruction.     FINDINGS:         *BRAIN:      -  There are no early signs of territorial or lacunar infarction by CT.     -  No intracranial mass, hematoma, or hydrocephalus.      -  For patient's age, the scattered areas of white matter hypodensities may  represent a chronic small vessel white matter ischemia.  However this is  nonspecific.     *VISUALIZED PARANASAL SINUSES: Well aerated. *MASTOIDS:  Clear. *CALVARIUM AND SCALP: Unremarkable.        IMPRESSION  IMPRESSION:     No acute abnormalities.          HISTORY: Vision problems     FINDINGS:     Duplex doppler carotid ultrasound exams performed of both the right and left  side of the neck. NASCET criteria.     Peak systolic velocity right common carotid artery 96 cm/s, right internal  carotid of 98 cm/s with a ratio of 1.0 on the right. Right internal carotid  artery end-diastolic velocity of 28 cm/s.     Peak systolic velocity left common carotid artery 108 cm/s, internal carotid of  89 cm/s with a ratio of 0.8 on the left. Left internal carotid artery  end-diastolic velocity of 20 cm/s.     Antegrade flow right vertebral artery and antegrade flow left vertebral artery.     Grayscale and color-flow imaging reveal bilateral carotid artery  atherosclerosis.     IMPRESSION  IMPRESSION:     Bilateral carotid artery atherosclerosis. However, no hemodynamically  significant internal carotid artery stenosis.        MRI BRAIN WITHOUT CONTRAST 10/29/2019     HISTORY: 80-year-old male with vision loss left eye since Monday.     TECHNIQUE: Sagittal and axial T1-weighted, axial T2-weighted, axial and coronal  FLAIR, axial T2-weighted gradient-echo, axial diffusion weighted images with ADC  maps of the brain.     COMPARISON: Head CT October 28, 2019     FINDINGS: There is no acute infarction, acute intracranial hemorrhage,  significant mass effect.     Mild to moderate diffuse cerebral volume loss is present. On the T2-weighted and  FLAIR sequences, there are multiple white matter hyperintensities throughout the  supratentorial brain.  Findings are nonspecific and can be seen with chronic  small vessel ischemic disease, demyelinating disease or with migraine headaches.     Mild mucosal thickening is present in the maxillary sinuses.     There is no intra-axial mass, hydrocephalus or extra-axial hematoma.           IMPRESSION  IMPRESSION:     1. No acute infarction.     2. Mild/moderate cerebral volume loss and white matter findings present as  described. This pattern may be present with chronic small vessel ischemic  disease.         Labs: Results:       Chemistry Recent Labs     10/29/19  0501 10/28/19  2040   GLU 77 83    142   K 3.9 3.7   * 111*   CO2 25 24   BUN 21 23   CREA 1.03 1.13   CA 8.7 8.7   AGAP 6* 7   TBILI  --  0.4   ALT  --  27   AP  --  67   TP  --  7.4   ALB  --  3.8   GLOB  --  3.6*   AGRAT  --  1.1*      CBC w/Diff Recent Labs     10/29/19  0501 10/28/19  2040   WBC 6.5 8.8   RBC 4.04* 4.30   HGB 13.0* 13.9   HCT 39.2* 41.4    183   GRANS  --  58   LYMPH  --  25   EOS  --  4      Cardiac Enzymes No results for input(s): CPK, CKND1, JOSE in the last 72 hours. No lab exists for component: CKRMB, TROIP   Coagulation Recent Labs     10/28/19  2038   INR 1.0       Lipid Panel @BRIEFLAB(CHOL,CHOLPOCT,550887,334262,[id number],CHOLX,CHOLP,CHLST,CHOLV,046409,HDL,HDLPOC,HDLPOCT,816410,NHDLCT,[id number],HDLC,HDLP,LDL,LDLPOCT,LDLCPOC,290179,NLDLCT,DLDL,LDLC,DLDLP,171509,VLDLC,VLDL,TGL,TGLX,TRIGL,[id number],TRIGP,TGLPOCT,451995,874798,CHHD,CHHDX)@   BNP No results for input(s): BNPP in the last 72 hours. Liver Enzymes Recent Labs     10/28/19  2040   TP 7.4   ALB 3.8   AP 67   SGOT 28      Thyroid Studies Lab Results   Component Value Date/Time    TSH 5.290 (H) 10/29/2019 05:01 AM            Discharge Exam:  Visit Vitals  /67   Pulse (!) 53   Temp 97.4 °F (36.3 °C)   Resp 18   Ht 5' 11\" (1.803 m)   Wt 88.9 kg (196 lb)   SpO2 97%   BMI 27.34 kg/m²     General:       No acute distress    Lungs:          CTA bilaterally. Resp even and nonlabored  Heart:            S1S2 present without murmurs rubs gallops. RRR. No LE edema  Abdomen:    Soft, non tender, non distended. BS present  Extremities: Moves ext spontaneously. No cyanosis  Neurologic:  A/O X4.  Loss of vision left eye.  Decrease vision right eye that is chronic. Peripheral vision decreased both eyes.  EOMs intact. Bilateral UE/LE strength 5/5 without drift. Speech clear. Sensation intact. Coordination intact. Disposition: home  Discharge Condition: stable  Patient Instructions:   Current Discharge Medication List      CONTINUE these medications which have NOT CHANGED    Details   !! OTHER by IntraMUSCular route every month. Indications: pollen, grass and cat allergy shot. Unsure of name/dose      losartan (COZAAR) 50 mg tablet Take 50 mg by mouth daily. raNITIdine (ZANTAC) 150 mg tablet Take 150 mg by mouth nightly. metoprolol succinate (TOPROL-XL) 25 mg XL tablet Take 0.5 Tabs by mouth daily. Qty: 90 Tab, Refills: 3      !! OTHER Take  by mouth two (2) times a day. PerserVision 1 po bid for eyes   Indications: viteyes      amLODIPine (NORVASC) 5 mg tablet Take 1 Tab by mouth daily. Qty: 90 Tab, Refills: 3      clopidogrel (PLAVIX) 75 mg tablet Take 75 mg by mouth daily. ZETIA 10 mg tablet Take 10 mg by mouth daily. Patient states he takes at bedtime  Refills: 3      aspirin 81 mg chewable tablet Take 1 Tab by mouth daily. rosuvastatin (CRESTOR) 40 mg tablet Take 40 mg by mouth nightly. nitroglycerin (NITROSTAT) 0.4 mg SL tablet 1 Tab by SubLINGual route every five (5) minutes as needed for Chest Pain. Qty: 25 Tab, Refills: 11       !! - Potential duplicate medications found. Please discuss with provider.       STOP taking these medications       meloxicam (MOBIC) 15 mg tablet Comments:   Reason for Stopping:               Activity: No driving until cleared by Ophthalmology   Diet: Cardiac Diet  Wound Care: None needed      Full Code   Surrogate decision maker:  Wife    Pneumonia and flu vaccine to be administered at discharge per hospital protocol     Follow-up  · FU PCP as scheduled  · FU Cardiology as scheduled  · FU Ophthalmology as scheduled  · Instructed pt not to drive until cleared by Ophthalmology, pt verbalized understanding.       Notes, labs, VS, diagnostic testing  Case discussed with pt, care team, Dr. Tanvir Holland      Time spent to discharge patient  45 min   Signed:  Luis Eduardo Khoury NP  10/30/2019  8:48 AM

## 2019-10-30 NOTE — DISCHARGE INSTRUCTIONS
Reduced Vision: Care Instructions  Your Care Instructions    Reduced vision can be caused by many things. These include macular degeneration and glaucoma. When you can't see as well, daily life can be more challenging. But you can do some things to stay independent and keep doing the activities you enjoy. Follow-up care is a key part of your treatment and safety. Be sure to make and go to all appointments, and call your doctor if you are having problems. It's also a good idea to know your test results and keep a list of the medicines you take. How can you care for yourself at home? Use lighting  · Point lighting at what you want to see. Don't point it at your eyes. · Add lamps where you need extra lighting. · Use curtains or shades to adjust how much natural light there is. · Use good lighting in places where you could easily fall. These include entries and stairways. Use labels  · Label things that are hard to recognize or that could be confusing. This might include medicines, spices, and foods. Use black letters on a white background. Or you can color-code the items. · Hawa Lemusr the positions of the temperature settings you use the most on your stove and oven. Also murphy the \"on\" and \"off\" positions. · Murphy the water temperatures you use on faucets in the kitchen and bathroom. To prevent overfilling a sink or bathtub, use waterproof markers or tape to murphy the water level you want. Avoid falls in your home  · Replace or remove any worn carpeting. Tape down or remove area rugs. · Do not wax your floors. Use nonskid, nonglare  on smooth floors. · Remove electrical cords from areas where you need to walk. Or tape them down so you won't trip on them. · Make sure furniture doesn't stick out into areas where you walk. Keep chairs pushed in under tables and desks. Keep all drawers closed. · Keep doors fully opened or fully closed. Don't leave them nursing home open or shut.   · Use handrails on stairways and ramps. Make sure that they go beyond the top and bottom steps. Then you won't stumble if you miss a step. Use helpful technology  · Use a magnifying lens. You can buy ones that you hold. Or you can buy ones that attach to glasses. Some have lights built in.  · If your budget allows, you may want to think about a video magnifier system. These systems can make print, pictures, or other items bigger on a screen. · If you have a computer:  ? Try to adjust the display. You can often change how big the text and pictures appear. Then they will be easier to see and read. ? You may want to try special software. Some software can recognize spoken commands or change dictated speech into text. Other software allows computers to speak text and read documents. · Use large-print items. These include books, newspapers, magazines, and medicine labels. You can also listen to recordings of books. · Think about using devices made for people with low vision. Examples are clocks and watches that announce the time. There are also clocks, telephones, and calculators with extra-large buttons. Be safe while you stay active  · Ask your doctor what physical activities are safe for you. If you bend, lift things, or move fast, it may affect your health or vision. · Ask a friend to read you the instructions for a new exercise and to check your technique. · Walk with someone who can help look for things that may be a danger. · If you swim laps, use a pool that has ropes between the lanes. When should you call for help? Watch closely for changes in your health, and be sure to contact your doctor if:    · You have vision changes. Where can you learn more? Go to http://ingris-anahi.info/. Enter X940 in the search box to learn more about \"Reduced Vision: Care Instructions. \"  Current as of: May 5, 2019  Content Version: 12.2  © 5714-5760 [employer].  Care instructions adapted under license by [facility] (which disclaims liability or warranty for this information). If you have questions about a medical condition or this instruction, always ask your healthcare professional. [address] any warranty or liability for your use of this information. DISCHARGE SUMMARY from Nurse    PATIENT INSTRUCTIONS:    After general anesthesia or intravenous sedation, for 24 hours or while taking prescription Narcotics:  · Limit your activities  · Do not drive and operate hazardous machinery  · Do not make important personal or business decisions  · Do  not drink alcoholic beverages  · If you have not urinated within 8 hours after discharge, please contact your surgeon on call. Report the following to your surgeon:  · Excessive pain, swelling, redness or odor of or around the surgical area  · Temperature over 100.5  · Nausea and vomiting lasting longer than 4 hours or if unable to take medications  · Any signs of decreased circulation or nerve impairment to extremity: change in color, persistent  numbness, tingling, coldness or increase pain  · Any questions    What to do at Home:  Recommended activity: Activity as tolerated. If you experience any of the following symptoms:  More vision difficulties,  please follow up with your doctor. *  Please give a list of your current medications to your Primary Care Provider. *  Please update this list whenever your medications are discontinued, doses are      changed, or new medications (including over-the-counter products) are added. *  Please carry medication information at all times in case of emergency situations. These are general instructions for a healthy lifestyle:    No smoking/ No tobacco products/ Avoid exposure to second hand smoke  Surgeon General's Warning:  Quitting smoking now greatly reduces serious risk to your health.     Obesity, smoking, and sedentary lifestyle greatly increases your risk for illness    A healthy diet, regular physical exercise & weight monitoring are important for maintaining a healthy lifestyle    You may be retaining fluid if you have a history of heart failure or if you experience any of the following symptoms:  Weight gain of 3 pounds or more overnight or 5 pounds in a week, increased swelling in our hands or feet or shortness of breath while lying flat in bed. Please call your doctor as soon as you notice any of these symptoms; do not wait until your next office visit. The discharge information has been reviewed with the patient. The patient verbalized understanding. Discharge medications reviewed with the patient and appropriate educational materials and side effects teaching were provided.   ___________________________________________________________________________________________________________________________________

## 2019-11-14 ENCOUNTER — HOSPITAL ENCOUNTER (OUTPATIENT)
Dept: LAB | Age: 84
Discharge: HOME | End: 2019-11-14
Payer: MEDICARE

## 2019-11-14 DIAGNOSIS — I25.10 CORONARY ARTERY DISEASE INVOLVING NATIVE CORONARY ARTERY OF NATIVE HEART WITHOUT ANGINA PECTORIS: ICD-10-CM

## 2019-11-14 PROBLEM — E78.2 MIXED HYPERLIPIDEMIA: Status: ACTIVE | Noted: 2019-11-14

## 2019-11-14 PROBLEM — Z98.61 HISTORY OF PTCA: Status: ACTIVE | Noted: 2019-11-14

## 2019-11-14 PROBLEM — I21.19 MYOCARDIAL INFARCTION OF INFERIOR WALL (HCC): Status: ACTIVE | Noted: 2019-11-14

## 2019-11-14 PROBLEM — H34.12: Status: ACTIVE | Noted: 2019-11-14

## 2019-11-14 LAB
ANION GAP SERPL CALC-SCNC: 8 MMOL/L (ref 7–16)
BASOPHILS # BLD: 0 K/UL (ref 0–0.2)
BASOPHILS NFR BLD: 1 % (ref 0–2)
BUN SERPL-MCNC: 17 MG/DL (ref 8–23)
CALCIUM SERPL-MCNC: 9.4 MG/DL (ref 8.3–10.4)
CHLORIDE SERPL-SCNC: 107 MMOL/L (ref 98–107)
CO2 SERPL-SCNC: 27 MMOL/L (ref 21–32)
CREAT SERPL-MCNC: 1.2 MG/DL (ref 0.8–1.5)
DIFFERENTIAL METHOD BLD: ABNORMAL
EOSINOPHIL # BLD: 0.4 K/UL (ref 0–0.8)
EOSINOPHIL NFR BLD: 5 % (ref 0.5–7.8)
ERYTHROCYTE [DISTWIDTH] IN BLOOD BY AUTOMATED COUNT: 13 % (ref 11.9–14.6)
GLUCOSE SERPL-MCNC: 83 MG/DL (ref 65–100)
HCT VFR BLD AUTO: 46.3 % (ref 41.1–50.3)
HGB BLD-MCNC: 15.2 G/DL (ref 13.6–17.2)
IMM GRANULOCYTES # BLD AUTO: 0 K/UL (ref 0–0.5)
IMM GRANULOCYTES NFR BLD AUTO: 0 % (ref 0–5)
LYMPHOCYTES # BLD: 2.1 K/UL (ref 0.5–4.6)
LYMPHOCYTES NFR BLD: 26 % (ref 13–44)
MCH RBC QN AUTO: 31.9 PG (ref 26.1–32.9)
MCHC RBC AUTO-ENTMCNC: 32.8 G/DL (ref 31.4–35)
MCV RBC AUTO: 97.3 FL (ref 79.6–97.8)
MONOCYTES # BLD: 0.9 K/UL (ref 0.1–1.3)
MONOCYTES NFR BLD: 11 % (ref 4–12)
NEUTS SEG # BLD: 4.7 K/UL (ref 1.7–8.2)
NEUTS SEG NFR BLD: 58 % (ref 43–78)
NRBC # BLD: 0 K/UL (ref 0–0.2)
PLATELET # BLD AUTO: 181 K/UL (ref 150–450)
PMV BLD AUTO: 8.9 FL (ref 9.4–12.3)
POTASSIUM SERPL-SCNC: 4.6 MMOL/L (ref 3.5–5.1)
RBC # BLD AUTO: 4.76 M/UL (ref 4.23–5.6)
SODIUM SERPL-SCNC: 142 MMOL/L (ref 136–145)
WBC # BLD AUTO: 8 K/UL (ref 4.3–11.1)

## 2019-11-14 PROCEDURE — 36415 COLL VENOUS BLD VENIPUNCTURE: CPT

## 2019-11-14 PROCEDURE — 85025 COMPLETE CBC W/AUTO DIFF WBC: CPT

## 2019-11-14 PROCEDURE — 80048 BASIC METABOLIC PNL TOTAL CA: CPT

## 2019-11-19 ENCOUNTER — HOSPITAL ENCOUNTER (OUTPATIENT)
Dept: CARDIAC CATH/INVASIVE PROCEDURES | Age: 84
Discharge: HOME | End: 2019-11-19
Attending: INTERNAL MEDICINE | Admitting: INTERNAL MEDICINE
Payer: MEDICARE

## 2019-11-19 VITALS
OXYGEN SATURATION: 93 % | DIASTOLIC BLOOD PRESSURE: 52 MMHG | HEIGHT: 71 IN | BODY MASS INDEX: 27.3 KG/M2 | WEIGHT: 195 LBS | HEART RATE: 64 BPM | SYSTOLIC BLOOD PRESSURE: 134 MMHG | RESPIRATION RATE: 16 BRPM

## 2019-11-19 LAB
ATRIAL RATE: 51 BPM
CALCULATED P AXIS, ECG09: 48 DEGREES
CALCULATED R AXIS, ECG10: -6 DEGREES
CALCULATED T AXIS, ECG11: 10 DEGREES
DIAGNOSIS, 93000: NORMAL
P-R INTERVAL, ECG05: 170 MS
Q-T INTERVAL, ECG07: 474 MS
QRS DURATION, ECG06: 110 MS
QTC CALCULATION (BEZET), ECG08: 436 MS
VENTRICULAR RATE, ECG03: 51 BPM

## 2019-11-19 PROCEDURE — 93458 L HRT ARTERY/VENTRICLE ANGIO: CPT

## 2019-11-19 PROCEDURE — 77030029997 HC DEV COM RDL R BND TELE -B

## 2019-11-19 PROCEDURE — 99152 MOD SED SAME PHYS/QHP 5/>YRS: CPT

## 2019-11-19 PROCEDURE — 77030004534 HC CATH ANGI DX INFN CARD -A

## 2019-11-19 PROCEDURE — 74011250636 HC RX REV CODE- 250/636: Performed by: INTERNAL MEDICINE

## 2019-11-19 PROCEDURE — 74011636320 HC RX REV CODE- 636/320: Performed by: INTERNAL MEDICINE

## 2019-11-19 PROCEDURE — 93005 ELECTROCARDIOGRAM TRACING: CPT | Performed by: INTERNAL MEDICINE

## 2019-11-19 PROCEDURE — 74011000250 HC RX REV CODE- 250: Performed by: INTERNAL MEDICINE

## 2019-11-19 PROCEDURE — 77030040934 HC CATH DIAG DXTERITY MEDT -A

## 2019-11-19 PROCEDURE — C1894 INTRO/SHEATH, NON-LASER: HCPCS

## 2019-11-19 PROCEDURE — C1769 GUIDE WIRE: HCPCS

## 2019-11-19 RX ORDER — MIDAZOLAM HYDROCHLORIDE 1 MG/ML
.5-2 INJECTION, SOLUTION INTRAMUSCULAR; INTRAVENOUS
Status: DISCONTINUED | OUTPATIENT
Start: 2019-11-19 | End: 2019-11-19 | Stop reason: HOSPADM

## 2019-11-19 RX ORDER — GUAIFENESIN 100 MG/5ML
324 LIQUID (ML) ORAL
Status: DISCONTINUED | OUTPATIENT
Start: 2019-11-19 | End: 2019-11-19 | Stop reason: HOSPADM

## 2019-11-19 RX ORDER — HEPARIN SODIUM 200 [USP'U]/100ML
3 INJECTION, SOLUTION INTRAVENOUS CONTINUOUS
Status: DISCONTINUED | OUTPATIENT
Start: 2019-11-19 | End: 2019-11-19 | Stop reason: HOSPADM

## 2019-11-19 RX ORDER — LIDOCAINE HYDROCHLORIDE 10 MG/ML
6 INJECTION INFILTRATION; PERINEURAL ONCE
Status: COMPLETED | OUTPATIENT
Start: 2019-11-19 | End: 2019-11-19

## 2019-11-19 RX ORDER — SODIUM CHLORIDE 9 MG/ML
75 INJECTION, SOLUTION INTRAVENOUS CONTINUOUS
Status: DISCONTINUED | OUTPATIENT
Start: 2019-11-19 | End: 2019-11-19 | Stop reason: HOSPADM

## 2019-11-19 RX ADMIN — HEPARIN SODIUM 3 ML/HR: 200 INJECTION, SOLUTION INTRAVENOUS at 15:15

## 2019-11-19 RX ADMIN — LIDOCAINE HYDROCHLORIDE 6 ML: 10 INJECTION, SOLUTION INFILTRATION; PERINEURAL at 15:15

## 2019-11-19 RX ADMIN — SODIUM CHLORIDE 75 ML/HR: 900 INJECTION, SOLUTION INTRAVENOUS at 12:55

## 2019-11-19 RX ADMIN — MIDAZOLAM 2 MG: 1 INJECTION INTRAMUSCULAR; INTRAVENOUS at 15:13

## 2019-11-19 RX ADMIN — HEPARIN SODIUM 2 ML: 10000 INJECTION INTRAVENOUS; SUBCUTANEOUS at 15:17

## 2019-11-19 RX ADMIN — IOPAMIDOL 50 ML: 755 INJECTION, SOLUTION INTRAVENOUS at 15:25

## 2019-11-19 NOTE — PROCEDURES
Cardiac Catheterization Procedure Note    Patient ID:     Name: Dulce Maria Easton   Medical Record Number: [id number]   YOB: 1935    Date of Procedure: 11/19/2019     Pre-procedure Diagnosis:  Typical Angina    Post-procedure Diagnosis: Coronary Artery Disease    Reason for Procedure: Worsening Angina    Blood loss less than 5 ml    Sedation. Pt received 2 mg versed and 0 mcg fentanyl for monitored conscious sedation from 300to 330. Nurse trevino    Specimen: None    No complications    No assistants    Time out, Mallampati, and ASA performed    Procedure:  After informed consent, patient was prepped and draped in the usual sterile fashion. radial approach was used. 80cc Visipaque contrast were utilized for the entire procedure. no closure device used        FINDINGS    Left Ventricle: 50  LVEDP: 15    Left Main:ok    Left Anterior descending coronary artery: mid 40%       Left Circumflex coronary artery: prox 40%        Right coronary artery: diffuse mild disease            Graft anatomy: na    Intervention if done: na    Conclusions: mild to mod diffuse disease.  No obstructive disease    Recommentations: med tx    No complications      Signed By: Caitlyn Hathaway MD

## 2019-11-19 NOTE — PROGRESS NOTES
Report received from [address] [facility] [id number]. Procedural findings communicated. Intra procedural  medication administration reviewed. Progression of care discussed.      Patient received into [address] [facility] 6 post sheath removal.     Access site without bleeding or swelling yes    Dressing dry and intact yes    Patient instructed to limit movement to right upper extremity    Routine post procedural vital signs and site assessment initiated yes

## 2019-11-19 NOTE — PROGRESS NOTES
Discharge instructions given. TR band removed with 4x4 gauze dsg and tegaderm applied to right wrist with no bleeding or swelling noted. Wife at bedside.

## 2019-11-19 NOTE — DISCHARGE INSTRUCTIONS
HEART CATHETERIZATION/ANGIOGRAPHY DISCHARGE INSTRUCTIONS    1. Check puncture site frequently for swelling or bleeding. If there is any bleeding, lie down and apply pressure over the area with a clean towel or washcloth. Notify your doctor for any redness, swelling, drainage, or oozing from the puncture site. Notify your doctor for any fever or chills. 2. If the extremity becomes cold, numb, or painful call [address] Cardiology at [phone].  3. Activity should be limited for the next 48 hours. Climb stairs as little as possible and avoid any stooping, bending, or strenuous activity for 48 hours. No heavy lifting (anything over 10 pounds) for 3 days. 4. You may resume your usual diet. Drink more fluids than usual.  5. Have a responsible person drive you home and stay with you for at least 24 hours after your heart catheterization/angiography. 6. You may remove bandage from your Right wrist in 24 hours. You may shower in 24 hours. No tub baths, hot tubs, or swimming for 1 week. Do not place any lotions, creams, powders, or ointments over puncture site for 1 week. You may place a clean band-aid over the puncture site each day for 5 days. Change daily. I have read the above instructions and have had the opportunity to ask questions.       Patient: ________________________   Date: 11/19/2019    Witness: _______________________   Date: 11/19/2019

## 2019-11-19 NOTE — PROGRESS NOTES
Patient received to [address] [place] [address] room # 11. Ambulatory from [facility]. Patient scheduled for [facility] today with Dr. Valencia Arroyo. Procedure reviewed & questions answered, voiced good understanding consent obtained & placed on chart. All medications and medical history reviewed. Will prep patient per orders. Patient & family updated on plan of care.        The patient has a fraility score of 3-MANAGING WELL, based on ability to perform ADLs by self. Pt took 81mg ASA x 4 this AM at 0800.

## 2019-11-19 NOTE — PROGRESS NOTES
Patient pre-assessment complete for [facility] poss with DR Corona scheduled for 19 at 12:30pm, arrival time 10:30am. Patient verified using . Patient instructed to bring all home medications in labeled bottles on the day of procedure. NPO status reinforced. Patient informed to take a full dose aspirin 325mg  or 81 mg x 4 on the day of procedure. Instructed they can take all other medications excluding vitamins & supplements. Patient verbalizes understanding of all instructions & denies any questions at this time.

## 2019-11-19 NOTE — PROGRESS NOTES
TRANSFER - OUT REPORT:    Verbal report given to Lilia(name) on Alexei Pena  being transferred to cpru(unit) for routine progression of care       Report consisted of patients Situation, Background, Assessment and   Recommendations(SBAR). Information from the following report(s) SBAR was reviewed with the receiving nurse. Opportunity for questions and clarification was provided. Procedure: [facility]   Finding Summary: no interventions(cath/pci/pacer settings)  Location: rwrist    Closure Device: trband 12ml(yes/no/description)  Post Site Assessment: no bleeding no hematoma         Intra Procedure Meds:    Versed: 2mg             Peripheral IV 11/19/19 Anterior;Right Hand (Active)       Peripheral IV 11/19/19 Anterior; Left Forearm (Active)        Post-Procedure Site Assessment (1)  Wound Type: Catheter entry/exit  Location: Wrist  Orientation : Right  Hemostasis : TR Band(12ml)  Site Assessment: No bleeding, No hematoma                       is allergic to nuts [tree nut]; codeine; and penicillins.     Past Medical History:   Diagnosis Date    Acute myocardial infarction of other inferior wall, initial episode of care 3/18/2014    Arthritis 11/24/2018    Asthma     as a child    Bradycardia 8/7/2018    CAD (coronary artery disease)     stent x 11    Claudication of gluteal region [facility]) 11/15/2018    Claudication of left lower extremity ([address] 11/21/2018    Coronary artery disease involving native coronary artery of native heart without angina pectoris 8/16/2016    Dyslipidemia 3/18/2014    Dyspnea 6/29/2016    GERD (gastroesophageal reflux disease)     History of PTCA 7/11/2016    Hypertension     Iliac artery stenosis, left ([address] 11/21/2018    Myocardial infarction of inferior wall (HCC) 7/11/2016    Nausea & vomiting     Paroxysmal atrial fibrillation ([address] 6/29/2016    PVD (peripheral vascular disease) ([address]     stent in lt leg    Renal insufficiency 6/29/2016    S/P coronary artery stent placement(cutting ballon to mLAD, 3.0x18 Xience & 3.5x18 Xience to dRCA and 4.0x12 Ion to ostial RCA on 1/9/2012) 1/10/2012    S/P PTCA/STENT 1/9/2012    OV:10/15/15 6 stents to RCA 3/18/14 for RCA thrombotic occlusion. Prior LAD stent 2004. 1/9/12 -95% LAD stent ISR that was treated with cutting balloon atherectomy. Two Xience stents to the distal RCA and 4.0 Ion stent to the proximal RCA.      Stroke [facility])     retinal stroke    VF (ventricular fibrillation) ([facility] 3/18/2014     Visit Vitals  /70   Pulse 66   Resp 16   Ht 5' 11\" (1.803 m)   Wt 88.5 kg (195 lb)   SpO2 98%   BMI 27.20 kg/m²

## 2019-11-19 NOTE — H&P
Jhonny Crawford MD   Physician   Cardiology   Progress Notes      Signed   Encounter Date:  11/14/2019                    []Hide copied text    []Hover for details  [address] [facility], [address] [facility], [address] [place] [address], [address] [place] [address]  PHONE: [phone]     Ignacio Dillard  1935  PCP:  Chaya Babin MD     SUBJECTIVE:   Ignacio Dillard is a 80 y.o. male seen for a follow up visit regarding the following:           Chief Complaint   Patient presents with    Hypertension       6 mos    Irregular Heart Beat       6 mos         HPI - He has CAD with history of IMI 2014 and multivessel PCI dating back to 2004. He describes 3-4 episodes of sudden onset dyspnea, diaphoresis and weakness mimicking a heart attack. He had no chest pain with these events or with the prior MI. Suspect these may be coronary events - recommend [facility].     He also has sudden left eye visual loss 2 weeks ago and was admitted to [facility] for 2 nights. MRI showed no definite stroke. Ophthalmologist thought this was an acute left retinal artery occlusion - the vision has not improved.       Hx of PAD with interventions to both legs and prior aortic stent for AAA.                    Outpatient Medications Marked as Taking for the 11/14/19 encounter (Office Visit) with Jhonny Crawford MD   Medication Sig Dispense Refill    OTHER by IntraMUSCular route every month. Indications: pollen, grass and cat allergy shot. Unsure of name/dose        losartan (COZAAR) 50 mg tablet Take 50 mg by mouth daily.        raNITIdine (ZANTAC) 150 mg tablet Take 150 mg by mouth nightly.        nitroglycerin (NITROSTAT) 0.4 mg SL tablet 1 Tab by SubLINGual route every five (5) minutes as needed for Chest Pain. 25 Tab 11    metoprolol succinate (TOPROL-XL) 25 mg XL tablet Take 0.5 Tabs by mouth daily. (Patient taking differently: Take 12.5 mg by mouth nightly.) 90 Tab 3    OTHER Take  by mouth two (2) times a day.  PerserVision 1 po bid for eyes   Indications: viteyes        amLODIPine (NORVASC) 5 mg tablet Take 1 Tab by mouth daily. 90 Tab 3    clopidogrel (PLAVIX) 75 mg tablet Take 75 mg by mouth daily.        ZETIA 10 mg tablet Take 10 mg by mouth daily. Patient states he takes at bedtime   3    aspirin 81 mg chewable tablet Take 1 Tab by mouth daily.        rosuvastatin (CRESTOR) 40 mg tablet Take 40 mg by mouth nightly.               Allergies   Allergen Reactions    Nuts [Tree Nut] Anaphylaxis    Codeine Hives    Penicillins Angioedema           Past Medical History:   Diagnosis Date    Acute myocardial infarction of other inferior wall, initial episode of care 3/18/2014    Arthritis 11/24/2018    Asthma       as a child    Bradycardia 8/7/2018    CAD (coronary artery disease)       stent x 11    Claudication of gluteal region ([address] 11/15/2018    Claudication of left lower extremity ([address] 11/21/2018    Coronary artery disease involving native coronary artery of native heart without angina pectoris 8/16/2016    Dyslipidemia 3/18/2014    Dyspnea 6/29/2016    History of PTCA 7/11/2016    Hypertension      Iliac artery stenosis, left ([address] 11/21/2018    Myocardial infarction of inferior wall (HCC) 7/11/2016    Nausea & vomiting      Paroxysmal atrial fibrillation ([address] 6/29/2016    PVD (peripheral vascular disease) ([address]       stent in lt leg    Renal insufficiency 6/29/2016    S/P coronary artery stent placement(cutting ballon to mLAD, 3.0x18 Xience & 3.5x18 Xience to dRCA and 4.0x12 Ion to ostial RCA on 1/9/2012) 1/10/2012    S/P PTCA/STENT 1/9/2012     OV:10/15/15 6 stents to RCA 3/18/14 for RCA thrombotic occlusion. Prior LAD stent 2004. 1/9/12 -95% LAD stent ISR that was treated with cutting balloon atherectomy. Two Xience stents to the distal RCA and 4.0 Ion stent to the proximal RCA.      VF (ventricular fibrillation) ([address] 3/18/2014            Past Surgical History:   Procedure Laterality Date    CARDIAC SURG PROCEDURE UNLIST         11 stents    HX HEENT Bilateral      MI LEFT HEART CATH,PERCUTANEOUS   2012     1 stent LAD/ 3 stents RCA     VASCULAR SURGERY PROCEDURE UNLIST         lt leg            Family History   Problem Relation Age of Onset    Heart Disease Father        Social History            Tobacco Use    Smoking status: Former Smoker       Last attempt to quit: 1975       Years since quittin.8    Smokeless tobacco: Never Used   Substance Use Topics    Alcohol use: Yes       Alcohol/week: 5.8 standard drinks       Types: 7 Shots of liquor per week         Review of Systems   Constitutional: Negative. Negative for malaise/fatigue and weight loss. HENT: Negative for hearing loss. Eyes: Negative for blurred vision. Respiratory: Positive for shortness of breath. Negative for cough and wheezing. Cardiovascular: Negative for chest pain, palpitations, orthopnea and leg swelling. Gastrointestinal: Positive for nausea. Negative for abdominal pain, blood in stool, heartburn and melena. Genitourinary: Negative for hematuria. Musculoskeletal: Negative for myalgias. Skin: Negative for rash. Neurological: Negative for dizziness, speech change, focal weakness and headaches. Endo/Heme/Allergies: Does not bruise/bleed easily. Psychiatric/Behavioral: Negative for substance abuse. The patient does not have insomnia.                Visit Vitals  /82   Pulse (!) 56   Ht 5' 11\" (1.803 m)   Wt 200 lb (90.7 kg)   BMI 27.89 kg/m²                Wt Readings from Last 3 Encounters:   19 200 lb (90.7 kg)   10/28/19 196 lb (88.9 kg)   10/29/19 195 lb (88.5 kg)      BP Readings from Last 3 Encounters:   19 160/82   10/30/19 126/67   19 140/71            Physical Exam   Constitutional: He is oriented to person, place, and time. He appears well-developed and well-nourished. HENT:   Head: Normocephalic. Eyes: Pupils are equal, round, and reactive to light. No scleral icterus. Neck: No JVD present. No thyromegaly present. Cardiovascular: Normal rate, regular rhythm, normal heart sounds and intact distal pulses. Exam reveals no gallop and no friction rub. No murmur heard. Pulmonary/Chest: Breath sounds normal. No respiratory distress. He has no wheezes. He exhibits no tenderness. Abdominal: Soft. Bowel sounds are normal. He exhibits no distension and no mass. There is no tenderness. Musculoskeletal: Normal range of motion. He exhibits no edema or tenderness. Neurological: He is alert and oriented to person, place, and time. Coordination normal.   Skin: Skin is warm and dry. No rash noted. Psychiatric: He has a normal mood and affect. His behavior is normal.                           Lab Results   Component Value Date/Time     Cholesterol, total 121 10/29/2019 05:01 AM     HDL Cholesterol 60 10/29/2019 05:01 AM     LDL, calculated 47.2 10/29/2019 05:01 AM     VLDL, calculated 13.8 10/29/2019 05:01 AM     Triglyceride 69 10/29/2019 05:01 AM     CHOL/HDL Ratio 2.0 10/29/2019 05:01 AM            ASSESSMENT and PLAN:  Diagnoses and all orders for this visit:     1. Myocardial infarction of inferior wall (HCC)     2. PAD (peripheral artery disease) (HCC)     3. History of PTCA  -     LEFT HEART CATH; Future     4. Mixed hyperlipidemia     5. Coronary artery disease involving native coronary artery of native heart without angina pectoris  -     METABOLIC PANEL, BASIC; Future  -     CBC WITH AUTOMATED DIFF; Future     6. Retinal artery occlusion, central, left  -     MRA CHEST W WO CONT; Future           IMPRESSION:  Recommend [facility] possible PCI for recent symptoms of dyspnea and diaphoresis. CHeck BMP, CBC.    Will also get brain MRA to assess CNS arterial anatomy post left retinal artery occlusion.      Follow-up and Dispositions  ·   Return for Return after testing to discuss results.               Shukri Melo MD  11/14/2019  10:49 AM    Electronically signed by Warden Henry MD at 11/14/19 1148   Note Details     Author Warden Henry MD File Time 11/14/19 1144   Author Type Physician Status Signed   Last  Warden Henry MD Specialty Cardiology       Office Visit on 11/14/2019          Detailed Report         Note shared with patient

## 2019-11-20 NOTE — PROCEDURES
[address] [facility]  CARDIAC CATH    Name:  Felipe Galvez  MR#:  [id number]  :  1935  ACCOUNT #:  [de-identified]  DATE OF SERVICE:  2019    PROCEDURES PERFORMED:  Left heart cath, selective coronary angiography, left ventriculogram.    PREOPERATIVE DIAGNOSIS:  Angina. POSTOPERATIVE DIAGNOSIS:  Stable coronary artery disease. SURGEON:  Fide Rosales MD    ASSISTANT:  None    ESTIMATED BLOOD LOSS:  3 mL. SPECIMENS REMOVED:  None. COMPLICATIONS:  None. IMPLANTS:  None. ANESTHESIA:  2 mg of Versed were given between 03:00 and 03:30 p.m. by Rosie Kaufman. ACCESS:  Right radial.    80 mL of contrast was used. INDICATION:  Angina. FINDINGS:  Left ventriculogram done in [place] projection shows overall EF 55%, LVEDP of 10, aortic pressure 122/72. Left main arises normally, bifurcates into LAD and circumflex. Left main is moderate size with calcification in the wall, but no high-grade obstructive disease. LAD courses the apex, previously had been stented in its midportion. There is 40% in-stent restenosis. There is mild diffuse 20% irregularity through the remainder of the LAD. There is 40% diagonal disease. None of this appears to be critical or flow limiting. Circumflex artery in the AV groove has proximal 40% lesion and mild diffuse disease in the nondominant circ OM. The large dominant RCA has been extensively stented. There is diffuse moderate 30% plaque burden in the RCA. Distally, the posterior descending and posterior lateral have no significant disease. CONCLUSION:  Stable mild-to-moderate nonobstructive coronary artery disease. Continue medical therapy.       Cely Brown MD      [id number]  D:  2019 15:46  T:  2019 0:14  JOB #:  [id number]

## 2019-12-09 ENCOUNTER — HOSPITAL ENCOUNTER (OUTPATIENT)
Dept: MRI IMAGING | Age: 84
Discharge: HOME | End: 2019-12-09
Attending: INTERNAL MEDICINE
Payer: MEDICARE

## 2019-12-09 DIAGNOSIS — E78.2 MIXED HYPERLIPIDEMIA: ICD-10-CM

## 2019-12-09 DIAGNOSIS — H34.12 RETINAL ARTERY OCCLUSION, CENTRAL, LEFT: ICD-10-CM

## 2019-12-09 DIAGNOSIS — H54.62 VISION LOSS OF LEFT EYE: ICD-10-CM

## 2019-12-09 DIAGNOSIS — I77.9 BILATERAL CAROTID ARTERY DISEASE, UNSPECIFIED TYPE (HCC): ICD-10-CM

## 2019-12-09 DIAGNOSIS — I48.0 PAROXYSMAL ATRIAL FIBRILLATION (HCC): ICD-10-CM

## 2019-12-09 PROCEDURE — 70544 MR ANGIOGRAPHY HEAD W/O DYE: CPT

## 2019-12-11 PROBLEM — R55 NEAR SYNCOPE: Status: ACTIVE | Noted: 2019-12-11

## 2020-07-07 PROBLEM — Z95.828 HISTORY OF ENDOVASCULAR STENT GRAFT FOR ABDOMINAL AORTIC ANEURYSM (AAA): Status: ACTIVE | Noted: 2020-07-07

## 2020-08-05 PROBLEM — R61 DIAPHORESIS: Status: ACTIVE | Noted: 2020-08-05

## 2020-08-05 PROBLEM — I10 ESSENTIAL HYPERTENSION: Status: ACTIVE | Noted: 2020-08-05

## 2020-08-05 PROBLEM — Z95.5 H/O HEART ARTERY STENT: Status: ACTIVE | Noted: 2020-08-05

## 2020-08-05 PROBLEM — I48.0 PAF (PAROXYSMAL ATRIAL FIBRILLATION) (HCC): Status: ACTIVE | Noted: 2020-08-05

## 2020-10-22 PROBLEM — R06.02 SOB (SHORTNESS OF BREATH): Status: ACTIVE | Noted: 2020-10-22

## 2021-08-03 PROBLEM — I10 ESSENTIAL HYPERTENSION: Status: RESOLVED | Noted: 2020-08-05 | Resolved: 2021-08-03
